# Patient Record
Sex: FEMALE | Race: WHITE | NOT HISPANIC OR LATINO | Employment: STUDENT | URBAN - METROPOLITAN AREA
[De-identification: names, ages, dates, MRNs, and addresses within clinical notes are randomized per-mention and may not be internally consistent; named-entity substitution may affect disease eponyms.]

---

## 2018-01-15 NOTE — MISCELLANEOUS
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

## 2019-11-29 ENCOUNTER — OFFICE VISIT (OUTPATIENT)
Dept: URGENT CARE | Facility: CLINIC | Age: 24
End: 2019-11-29
Payer: COMMERCIAL

## 2019-11-29 VITALS
BODY MASS INDEX: 52.37 KG/M2 | TEMPERATURE: 98.7 F | RESPIRATION RATE: 18 BRPM | DIASTOLIC BLOOD PRESSURE: 78 MMHG | OXYGEN SATURATION: 100 % | WEIGHT: 293 LBS | SYSTOLIC BLOOD PRESSURE: 142 MMHG | HEART RATE: 83 BPM

## 2019-11-29 DIAGNOSIS — J20.9 ACUTE BRONCHITIS, UNSPECIFIED ORGANISM: Primary | ICD-10-CM

## 2019-11-29 PROCEDURE — 99213 OFFICE O/P EST LOW 20 MIN: CPT | Performed by: PHYSICIAN ASSISTANT

## 2019-11-29 RX ORDER — AZITHROMYCIN 250 MG/1
TABLET, FILM COATED ORAL
Qty: 6 TABLET | Refills: 0 | Status: SHIPPED | OUTPATIENT
Start: 2019-11-29 | End: 2019-12-03

## 2019-11-29 RX ORDER — BENZONATATE 200 MG/1
200 CAPSULE ORAL 3 TIMES DAILY PRN
Qty: 20 CAPSULE | Refills: 0 | Status: SHIPPED | OUTPATIENT
Start: 2019-11-29

## 2019-11-29 NOTE — PATIENT INSTRUCTIONS
Acute Bronchitis   WHAT YOU NEED TO KNOW:   Acute bronchitis is swelling and irritation in the air passages of your lungs  This irritation may cause you to cough or have other breathing problems  Acute bronchitis often starts because of another illness, such as a cold or the flu  The illness spreads from your nose and throat to your windpipe and airways  Bronchitis is often called a chest cold  Acute bronchitis lasts about 3 to 6 weeks and is usually not a serious illness  Your cough can last for several weeks  DISCHARGE INSTRUCTIONS:   Return to the emergency department if:   · You cough up blood  · Your lips or fingernails turn blue  · You feel like you are not getting enough air when you breathe  Contact your healthcare provider if:   · You have a fever  · Your breathing problems do not go away or get worse  · Your cough does not get better within 4 weeks  · You have questions or concerns about your condition or care  Self-care:   · Get more rest   Rest helps your body to heal  Slowly start to do more each day  Rest when you feel it is needed  · Avoid irritants in the air  Avoid chemicals, fumes, and dust  Wear a face mask if you must work around dust or fumes  Stay inside on days when air pollution levels are high  If you have allergies, stay inside when pollen counts are high  Do not use aerosol products, such as spray-on deodorant, bug spray, and hair spray  · Do not smoke or be around others who smoke  Nicotine and other chemicals in cigarettes and cigars damages the cilia that move mucus out of your lungs  Ask your healthcare provider for information if you currently smoke and need help to quit  E-cigarettes or smokeless tobacco still contain nicotine  Talk to your healthcare provider before you use these products  · Drink liquids as directed  Liquids help keep your air passages moist and help you cough up mucus   You may need to drink more liquids when you have acute bronchitis  Ask how much liquid to drink each day and which liquids are best for you  · Use a humidifier or vaporizer  Use a cool mist humidifier or a vaporizer to increase air moisture in your home  This may make it easier for you to breathe and help decrease your cough  Decrease risk for acute bronchitis:   · Get the vaccinations you need  Ask your healthcare provider if you should get vaccinated against the flu or pneumonia  · Prevent the spread of germs  You can decrease your risk of acute bronchitis and other illnesses by doing the following:     [facility] your hands often with soap and water  Carry germ-killing hand lotion or gel with you  You can use the lotion or gel to clean your hands when soap and water are not available  ¨ Do not touch your eyes, nose, or mouth unless you have washed your hands first     ¨ Always cover your mouth when you cough to prevent the spread of germs  It is best to cough into a tissue or your shirt sleeve instead of into your hand  Ask those around you cover their mouths when they cough  ¨ Try to avoid people who have a cold or the flu  If you are sick, stay away from others as much as possible  Medicines: Your healthcare provider may  give you any of the following:  · Ibuprofen or acetaminophen  are medicines that help lower your fever  They are available without a doctor's order  Ask your healthcare provider which medicine is right for you  Ask how much to take and how often to take it  Follow directions  These medicines can cause stomach bleeding if not taken correctly  Ibuprofen can cause kidney damage  Do not take ibuprofen if you have kidney disease, an ulcer, or allergies to aspirin  Acetaminophen can cause liver damage  Do not take more than 4,000 milligrams in 24 hours  · Decongestants  help loosen mucus in your lungs and make it easier to cough up  This can help you breathe easier  · Cough suppressants  decrease your urge to cough   If your cough produces mucus, do not take a cough suppressant unless your healthcare provider tells you to  Your healthcare provider may suggest that you take a cough suppressant at night so you can rest     · Inhalers  may be given  Your healthcare provider may give you one or more inhalers to help you breathe easier and cough less  An inhaler gives your medicine to open your airways  Ask your healthcare provider to show you how to use your inhaler correctly  · Take your medicine as directed  Contact your healthcare provider if you think your medicine is not helping or if you have side effects  Tell him of her if you are allergic to any medicine  Keep a list of the medicines, vitamins, and herbs you take  Include the amounts, and when and why you take them  Bring the list or the pill bottles to follow-up visits  Carry your medicine list with you in case of an emergency  Follow up with your healthcare provider as directed:  Write down questions you have so you will remember to ask them during your follow-up visits  © 2017 [employer] Information is for End User's use only and may not be sold, redistributed or otherwise used for commercial purposes  All illustrations and images included in CareNotes® are the copyrighted property of [employer]  or [employer]  The above information is an  only  It is not intended as medical advice for individual conditions or treatments  Talk to your doctor, nurse or pharmacist before following any medical regimen to see if it is safe and effective for you

## 2019-11-29 NOTE — PROGRESS NOTES
[facility]        NAME: Negin Fontenot is a 22 y o  female  : 1995    MRN: [id number]  DATE: 2019  TIME: 5:57 PM    Assessment and Plan   Acute bronchitis, unspecified organism [J20 [id number]]  1  Acute bronchitis, unspecified organism  azithromycin (ZITHROMAX) 250 mg tablet    benzonatate (TESSALON) 200 MG capsule         Patient Instructions     Discussed condition with pt  She has acute bronchitis which I will treat with combination of Z-pack and Tessalon Perles and rec hydration, rest, discussed OTC cold meds, and observation  Follow up with PCP in 3-5 days  Proceed to  ER if symptoms worsen  Chief Complaint     Chief Complaint   Patient presents with    Cough     x 1 week, bringing up green/yellow mucus  Taking mucinex DM OTC  History of Present Illness       Pt presents with one week history of productive cough with yellow/green phlegm, PND, chills  Denies ST, nasal congestion, fever, N/V/D  Has taken Mucinex DM  Denies asthma/allergies  Does not smoke or vape  She has not had the flu shot  Review of Systems   Review of Systems   Constitutional: Positive for chills  Negative for fever  HENT: Positive for postnasal drip  Negative for congestion and sore throat  Respiratory: Positive for cough  Cardiovascular: Negative  Gastrointestinal: Negative  Genitourinary: Negative  Current Medications       Current Outpatient Medications:     azithromycin (ZITHROMAX) 250 mg tablet, Take 2 tablets day 1 with food, then 1 tablet daily days 2-5 with food  , Disp: 6 tablet, Rfl: 0    benzonatate (TESSALON) 200 MG capsule, Take 1 capsule (200 mg total) by mouth 3 (three) times a day as needed for cough, Disp: 20 capsule, Rfl: 0    Current Allergies     Allergies as of 2019    (No Known Allergies)            The following portions of the patient's history were reviewed and updated as appropriate: allergies, current medications, past family history, past medical history, past social history, past surgical history and problem list      Past Medical History:   Diagnosis Date    Acute torn meniscus        Past Surgical History:   Procedure Laterality Date    ADENOIDECTOMY         History reviewed  No pertinent family history  Medications have been verified  Objective   /78 (BP Location: Left arm, Patient Position: Sitting, Cuff Size: Large)   Pulse 83   Temp 98 7 °F (37 1 °C) (Tympanic)   Resp 18   Wt (!) 156 kg (344 lb 6 4 oz)   SpO2 100%   BMI 52 37 kg/m²        Physical Exam     Physical Exam   Constitutional: She is oriented to person, place, and time  She appears well-developed and well-nourished  No distress  HENT:   Right Ear: Hearing, tympanic membrane, external ear and ear canal normal    Left Ear: Hearing, tympanic membrane, external ear and ear canal normal    Mouth/Throat: Mucous membranes are normal  Posterior oropharyngeal erythema (PND) present  No oropharyngeal exudate  No tonsillar exudate  Neck: Neck supple  Cardiovascular: Normal rate, regular rhythm and normal heart sounds  Pulmonary/Chest: Effort normal  She has no wheezes  She has rhonchi (B/L diffuse coarse breath sounds heard throughout)  Lymphadenopathy:     She has no cervical adenopathy  Neurological: She is alert and oriented to person, place, and time  Psychiatric: She has a normal mood and affect  Vitals reviewed

## 2020-12-15 ENCOUNTER — VBI (OUTPATIENT)
Dept: ADMINISTRATIVE | Facility: OTHER | Age: 25
End: 2020-12-15

## 2021-02-10 ENCOUNTER — VBI (OUTPATIENT)
Dept: ADMINISTRATIVE | Facility: OTHER | Age: 26
End: 2021-02-10

## 2021-11-24 ENCOUNTER — VBI (OUTPATIENT)
Dept: ADMINISTRATIVE | Facility: OTHER | Age: 26
End: 2021-11-24

## 2024-02-06 ENCOUNTER — TELEPHONE (OUTPATIENT)
Dept: PODIATRY | Facility: CLINIC | Age: 29
End: 2024-02-06

## 2024-02-06 ENCOUNTER — HOSPITAL ENCOUNTER (OUTPATIENT)
Dept: RADIOLOGY | Facility: HOSPITAL | Age: 29
Discharge: HOME | End: 2024-02-06
Payer: COMMERCIAL

## 2024-02-06 ENCOUNTER — OFFICE VISIT (OUTPATIENT)
Dept: PULMONOLOGY | Facility: CLINIC | Age: 29
End: 2024-02-06
Payer: COMMERCIAL

## 2024-02-06 ENCOUNTER — OFFICE VISIT (OUTPATIENT)
Age: 29
End: 2024-02-06
Payer: COMMERCIAL

## 2024-02-06 VITALS
HEART RATE: 96 BPM | DIASTOLIC BLOOD PRESSURE: 78 MMHG | TEMPERATURE: 97.6 F | OXYGEN SATURATION: 96 % | SYSTOLIC BLOOD PRESSURE: 128 MMHG

## 2024-02-06 VITALS — HEIGHT: 70 IN | BODY MASS INDEX: 41.95 KG/M2 | RESPIRATION RATE: 17 BRPM | WEIGHT: 293 LBS

## 2024-02-06 DIAGNOSIS — S93.492A SPRAIN OF ANTERIOR TALOFIBULAR LIGAMENT OF LEFT ANKLE, INITIAL ENCOUNTER: ICD-10-CM

## 2024-02-06 DIAGNOSIS — S86.012A ACHILLES TENDON RUPTURE, LEFT, INITIAL ENCOUNTER: ICD-10-CM

## 2024-02-06 DIAGNOSIS — M25.572 ACUTE LEFT ANKLE PAIN: ICD-10-CM

## 2024-02-06 DIAGNOSIS — M76.72 PERONEAL TENDINITIS OF LEFT LOWER EXTREMITY: ICD-10-CM

## 2024-02-06 DIAGNOSIS — S86.012A ACHILLES TENDON RUPTURE, LEFT, INITIAL ENCOUNTER: Primary | ICD-10-CM

## 2024-02-06 DIAGNOSIS — Z01.811 PREOPERATIVE RESPIRATORY EXAMINATION: Primary | ICD-10-CM

## 2024-02-06 DIAGNOSIS — E66.09 OBESITY DUE TO EXCESS CALORIES WITHOUT SERIOUS COMORBIDITY, UNSPECIFIED CLASSIFICATION: ICD-10-CM

## 2024-02-06 PROBLEM — K58.9 IBS (IRRITABLE BOWEL SYNDROME): Status: ACTIVE | Noted: 2024-02-06

## 2024-02-06 PROBLEM — E66.1 DRUG-INDUCED OBESITY: Status: ACTIVE | Noted: 2024-02-06

## 2024-02-06 PROBLEM — E66.1 DRUG-INDUCED OBESITY: Status: RESOLVED | Noted: 2024-02-06 | Resolved: 2024-02-06

## 2024-02-06 PROCEDURE — 73610 X-RAY EXAM OF ANKLE: CPT

## 2024-02-06 PROCEDURE — 99203 OFFICE O/P NEW LOW 30 MIN: CPT | Performed by: INTERNAL MEDICINE

## 2024-02-06 PROCEDURE — 99204 OFFICE O/P NEW MOD 45 MIN: CPT | Performed by: PODIATRIST

## 2024-02-06 RX ORDER — ERGOCALCIFEROL 1.25 MG/1
50000 CAPSULE ORAL WEEKLY
COMMUNITY
Start: 2024-01-22

## 2024-02-06 RX ORDER — FLUCONAZOLE 150 MG/1
150 TABLET ORAL DAILY
COMMUNITY
Start: 2024-01-24

## 2024-02-06 RX ORDER — ENOXAPARIN SODIUM 100 MG/ML
INJECTION SUBCUTANEOUS
COMMUNITY
Start: 2024-01-24 | End: 2024-02-09 | Stop reason: ALTCHOICE

## 2024-02-06 RX ORDER — AMOXICILLIN AND CLAVULANATE POTASSIUM 875; 125 MG/1; MG/1
1 TABLET, FILM COATED ORAL EVERY 12 HOURS
COMMUNITY
Start: 2024-01-24
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
## 2024-02-06 NOTE — PROGRESS NOTES
Pulmonary Consultation   Genevieve Mcqueen 28 y.o. female MRN: [id number]  2/6/2024      Reason for Consultation:  Pre-operative evaluation    Requested by: Orthopedics    Assessment:  Pre-operative respiratory examination  Planned achilles tendon repair  She has no pulmonary contraindications to surgery  Her only medica comorbidity is obesity, however she does not have evidence of OHVS and had normal serum bicarb  She can proceed to surgery without further testing and is low risk for the procedure based upon ARISCAT and PALMER calculators  She should be wheeze free and at baseline respiratory status on day of surgery  She should be monitor for hypoxia and hypoventilation in the post-operative period given her obesity and risk for obstructive apneas    Obesity - as above    Plan:    Diagnoses and all orders for this visit:    Preoperative respiratory examination    Obesity due to excess calories without serious comorbidity, unspecified classification    Other orders  -     amoxicillin-clavulanate (AUGMENTIN) 875-125 mg per tablet; Take 1 tablet by mouth every 12 (twelve) hours  -     enoxaparin (LOVENOX) 40 mg/0.4 mL; INJECT 1 SYRINGE SUBCUTANEOUSLY ONCE DAILY  -     ergocalciferol (VITAMIN D2) 50,000 units; Take 50,000 Units by mouth once a week  -     fluconazole (DIFLUCAN) 150 mg tablet; Take 150 mg by mouth daily        History of Present Illness   HPI:  Genevieve Mcqueen is a 28 y.o. female who has obesity and IBS. She presenting for pre-operative evaluation prior to achilles tendon repair planned at [facility]. She reports she was advised for pulmonary evaluation secondary to obesity. She denied any history of respiratory disorders. She reports having inhaler for a few days as a child during respiratory infection. She denied any history of asthma, bronchospasm, recurrent respiratory infections, KRISTEL, ILD, or COPD. She reports prior to her injury she would climb 5 fights of stairs to her apartment without  difficulty. She denied any issues with prior anesthesia - only prior surgery was wisdom teeth extraction and adenoidectomy. She is not taking any medications.    Review of Systems   Constitutional:  Negative for activity change, chills and fever.   HENT:  Negative for mouth sores, sore throat and trouble swallowing.    Respiratory:  Negative for cough, chest tightness, shortness of breath and wheezing.    Cardiovascular:  Negative for chest pain and leg swelling.   Gastrointestinal:  Positive for diarrhea. Negative for abdominal pain, nausea and vomiting.   Endocrine: Negative for cold intolerance and heat intolerance.   Musculoskeletal:  Positive for gait problem.   Allergic/Immunologic: Negative for immunocompromised state.   Hematological:  Negative for adenopathy.   Psychiatric/Behavioral:  Negative for sleep disturbance. The patient is not nervous/anxious.        Historical Information   Past Medical History:   Diagnosis Date    Acute torn meniscus      Past Surgical History:   Procedure Laterality Date    ADENOIDECTOMY       History reviewed. No pertinent family history.    Occupational History: works in media, [place]    Social History: social ETOH, social tobacco and VAPING use    Meds/Allergies     Current Outpatient Medications:     amoxicillin-clavulanate (AUGMENTIN) 875-125 mg per tablet, Take 1 tablet by mouth every 12 (twelve) hours, Disp: , Rfl:     benzonatate (TESSALON) 200 MG capsule, Take 1 capsule (200 mg total) by mouth 3 (three) times a day as needed for cough, Disp: 20 capsule, Rfl: 0    enoxaparin (LOVENOX) 40 mg/0.4 mL, INJECT 1 SYRINGE SUBCUTANEOUSLY ONCE DAILY, Disp: , Rfl:     ergocalciferol (VITAMIN D2) 50,000 units, Take 50,000 Units by mouth once a week, Disp: , Rfl:     fluconazole (DIFLUCAN) 150 mg tablet, Take 150 mg by mouth daily, Disp: , Rfl:   Allergies   Allergen Reactions    Gluten Meal - Food Allergy Diarrhea and GI Intolerance       Vitals: Blood pressure 128/78, pulse 96,  temperature 97.6 °F (36.4 °C), temperature source Tympanic, SpO2 96%., There is no height or weight on file to calculate BMI. Oxygen Therapy  SpO2: 96 %  Oxygen Therapy: None (Room air)    Physical Exam  Physical Exam  Vitals reviewed.   Constitutional:       General: She is not in acute distress.     Appearance: Normal appearance. She is well-developed. She is obese. She is not ill-appearing, toxic-appearing or diaphoretic.   HENT:      Head: Normocephalic and atraumatic.      Right Ear: External ear normal.      Left Ear: External ear normal.      Nose: Nose normal.      Mouth/Throat:      Mouth: Mucous membranes are moist.      Pharynx: Oropharynx is clear. No oropharyngeal exudate.   Eyes:      General: No scleral icterus.        Right eye: No discharge.         Left eye: No discharge.      Conjunctiva/sclera: Conjunctivae normal.      Pupils: Pupils are equal, round, and reactive to light.   Neck:      Vascular: No JVD.      Trachea: No tracheal deviation.   Cardiovascular:      Rate and Rhythm: Normal rate and regular rhythm.      Heart sounds: Normal heart sounds. No murmur heard.     No gallop.   Pulmonary:      Effort: Pulmonary effort is normal. No respiratory distress.      Breath sounds: Normal breath sounds. No stridor. No wheezing, rhonchi or rales.   Abdominal:      General: Bowel sounds are normal. There is no distension.      Palpations: Abdomen is soft.      Tenderness: There is no abdominal tenderness. There is no guarding or rebound.   Musculoskeletal:         General: No deformity.      Right lower leg: No edema.      Left lower leg: Edema present.      Comments: Boot in place on LLE   Lymphadenopathy:      Cervical: No cervical adenopathy.   Skin:     General: Skin is warm and dry.      Coloration: Skin is not jaundiced.      Findings: No erythema or rash.   Neurological:      General: No focal deficit present.      Mental Status: She is alert and oriented to person, place, and time.      Gait:  "Gait abnormal.   Psychiatric:         Mood and Affect: Mood normal.         Behavior: Behavior normal.         Thought Content: Thought content normal.         Labs: I have personally reviewed pertinent lab results.  No results found for: \"WBC\", \"HGB\", \"HCT\", \"MCV\", \"PLT\"  No results found for: \"GLUCOSE\", \"CALCIUM\", \"NA\", \"K\", \"CO2\", \"CL\", \"BUN\", \"CREATININE\"  No results found for: \"IGE\"  No results found for: \"ALT\", \"AST\", \"GGT\", \"ALKPHOS\", \"BILITOT\"    CareEverywhere  4/27/2023 - WBC 8.5, Hgb 13.6, plt 347, SCr 0.75, serum bicarb 24    Imaging and other studies: I have personally reviewed pertinent reports.    CT Chest 7/27/2023 - [facility] - \"Resolved left lower lobe nodules.  Clear lungs\"    Pulmonary function testing: none available       Dioni Can DO, FACP  [facility]  "

## 2024-02-06 NOTE — TELEPHONE ENCOUNTER
Caller: Genevieve Mcqueen    Doctor/Office: Devin Serrano CB#: [phone]    Escalation: Genevieve is at [facility] to get her x-ray.  It should be for her left foot, not the right foot.  Please put a new order in as she will wait so she can have it done now

## 2024-02-07 ENCOUNTER — TELEPHONE (OUTPATIENT)
Age: 29
End: 2024-02-07

## 2024-02-07 NOTE — TELEPHONE ENCOUNTER
----- Message from Brooke Bernstein MA sent at 2/6/2024  4:30 PM EST -----  Regarding: FW: Appointment for Genevieve    ----- Message -----  From: Ed Forte DPM  Sent: 2/6/2024   4:12 PM EST  To: [facility]  Subject: Appointment for Genevieve                        Per Dr. Serrano, please schedule patient for an appointment with me this week for preoperative planning.

## 2024-02-07 NOTE — TELEPHONE ENCOUNTER
Contacted patient and offered appointment today. Patient requested Friday. Appointment made for this Friday @ 10AM. Aware of location.

## 2024-02-09 ENCOUNTER — OFFICE VISIT (OUTPATIENT)
Age: 29
End: 2024-02-09
Payer: COMMERCIAL

## 2024-02-09 VITALS
BODY MASS INDEX: 41.95 KG/M2 | SYSTOLIC BLOOD PRESSURE: 146 MMHG | HEART RATE: 81 BPM | HEIGHT: 70 IN | WEIGHT: 293 LBS | DIASTOLIC BLOOD PRESSURE: 96 MMHG

## 2024-02-09 DIAGNOSIS — S86.012A ACHILLES TENDON RUPTURE, LEFT, INITIAL ENCOUNTER: Primary | ICD-10-CM

## 2024-02-09 DIAGNOSIS — S93.492A SPRAIN OF ANTERIOR TALOFIBULAR LIGAMENT OF LEFT ANKLE, INITIAL ENCOUNTER: ICD-10-CM

## 2024-02-09 DIAGNOSIS — Z29.9 ENCOUNTER FOR DEEP VEIN THROMBOSIS (DVT) PROPHYLAXIS: ICD-10-CM

## 2024-02-09 PROCEDURE — 99213 OFFICE O/P EST LOW 20 MIN: CPT | Performed by: STUDENT IN AN ORGANIZED HEALTH CARE EDUCATION/TRAINING PROGRAM

## 2024-02-09 NOTE — PROGRESS NOTES
This patient was seen on 2/9/2024.    My role is Foot , Ankle, and Wound Specialist    ASSESSMENT     Diagnoses and all orders for this visit:    Achilles tendon rupture, left, initial encounter  -     Ambulatory referral to Physical Therapy; Future  -     rivaroxaban (Xarelto) 10 mg tablet; Take 1 tablet (10 mg total) by mouth daily  -     Carla Benoit    Encounter for deep vein thrombosis (DVT) prophylaxis  -     rivaroxaban (Xarelto) 10 mg tablet; Take 1 tablet (10 mg total) by mouth daily    Sprain of anterior talofibular ligament of left ankle, initial encounter         Problem List Items Addressed This Visit          Musculoskeletal and Integument    Achilles tendon rupture, left, initial encounter - Primary    Relevant Medications    rivaroxaban (Xarelto) 10 mg tablet    Other Relevant Orders    Ambulatory referral to Physical Therapy    [facility]     Other Visit Diagnoses       Encounter for deep vein thrombosis (DVT) prophylaxis        Relevant Medications    rivaroxaban (Xarelto) 10 mg tablet    Sprain of anterior talofibular ligament of left ankle, initial encounter              PLAN  -Genevieve, her mother, and I discussed her left ankle in detail  -At this point given that the injury is 4 weeks old and that she has been in somewhat of a plantarflexed position since the time of her injury also in conjunction with her comorbidities I recommend nonsurgical treatment at this time  -I did discuss with Genevieve that she is likely at an increased risk of secondary Achilles tendon rupture moving forward  -Patient provided with full set of heel wedges to go in cam boot  -Will begin physical therapy per nonoperative Achilles tendon rupture protocol  -Return to clinic 2 weeks    SUBJECTIVE    Chief Complaint:  Left achilles tendon rupture     Patient ID: Genevieve Mcqueen     2/9/2024: Genevieve is a pleasant 28-year-old female who presents today with a left Achilles tendon rupture.  She states that this  "happened on 1/13/2024 where she was stepping out of a bus in [facility].  She states that she felt a pop, fell over, and could not get up.  She states that on 1/15/2024 she went to the emergency department and was told that she had a fracture.  An ultrasound was then performed a few days later which revealed a full-thickness tear to the Achilles tendon.  She was then sent for an MRI which was performed on 1/20/2024 which did in fact reveal a full-thickness tear of the Achilles tendon with approximately 4 cm gapping.  She states that she was put into a cam boot with heel pads added for plantarflexion.  The following Thursday she was supposed to have her Achilles tendon fixed however she was unable to get surgical clearance given that it was a surgical center.  She then went and saw a local podiatrist who sent her to my office for discussion on surgical intervention.        The following portions of the patient's history were reviewed and updated as appropriate: allergies, current medications, past family history, past medical history, past social history, past surgical history and problem list.    Review of Systems   Constitutional: Negative.    HENT: Negative.     Respiratory: Negative.     Cardiovascular: Negative.    Gastrointestinal: Negative.    Musculoskeletal:  Positive for gait problem.   Skin: Negative.          OBJECTIVE      /96   Pulse 81   Ht 5' 10\" (1.778 m)   Wt (!) 163 kg (360 lb)   BMI 51.65 kg/m²        Physical Exam  Constitutional:       Appearance: Normal appearance. She is obese.   HENT:      Head: Normocephalic and atraumatic.   Eyes:      General:         Right eye: No discharge.         Left eye: No discharge.   Cardiovascular:      Rate and Rhythm: Normal rate and regular rhythm.      Pulses:           Dorsalis pedis pulses are 2+ on the right side and 2+ on the left side.        Posterior tibial pulses are 2+ on the right side and 2+ on the left side.   Pulmonary:      Effort: " Pulmonary effort is normal.      Breath sounds: Normal breath sounds.   Skin:     General: Skin is warm.      Capillary Refill: Capillary refill takes less than 2 seconds.   Neurological:      Sensory: Sensation is intact. No sensory deficit.         Vascular:  -DP and PT pulses intact b/l  -Capillary refill time <2 sec b/l  -Skin temp: WNL    MSK:  -Pain on palpation to left medial malleolus, left posterior leg at the Achilles tendon  -Palpable delve noted approximately 3cm proximal to Achilles insertion, there does appear to be new tendon/soft tissue bulk formation to the area  -Patel test: Positive  -Simmonds test: Positive    Neuro:  -Light sensation intact bilaterally  -Protective sensation intact bilaterally    Derm:  -No lesions, abrasions, or open wounds noted  -Edema not not present  -Ecchymosis not present

## 2024-02-12 ENCOUNTER — TELEPHONE (OUTPATIENT)
Dept: OBGYN CLINIC | Facility: HOSPITAL | Age: 29
End: 2024-02-12

## 2024-02-12 NOTE — TELEPHONE ENCOUNTER
Caller: Patient    Doctor: Reanna    Reason for call: Patient calling because she states Xarleto is too costly.  Is there an alternative?  Please advise.    [facility]    Call back#: [phone]

## 2024-02-19 ENCOUNTER — EVALUATION (OUTPATIENT)
Dept: PHYSICAL THERAPY | Facility: CLINIC | Age: 29
End: 2024-02-19
Payer: COMMERCIAL

## 2024-02-19 DIAGNOSIS — S86.012A ACHILLES TENDON RUPTURE, LEFT, INITIAL ENCOUNTER: Primary | ICD-10-CM

## 2024-02-19 PROCEDURE — 97161 PT EVAL LOW COMPLEX 20 MIN: CPT | Performed by: PHYSICAL THERAPIST

## 2024-02-19 PROCEDURE — 97110 THERAPEUTIC EXERCISES: CPT | Performed by: PHYSICAL THERAPIST

## 2024-02-19 PROCEDURE — 97530 THERAPEUTIC ACTIVITIES: CPT | Performed by: PHYSICAL THERAPIST

## 2024-02-19 NOTE — PROGRESS NOTES
PT Evaluation     Today's date: 2024  Patient name: Genevieve Mcqueen  : 1995  MRN: [id number]  Referring provider: Ed Forte DPM  Dx:   Encounter Diagnosis     ICD-10-CM    1. Achilles tendon rupture, left, initial encounter  S86.012A Ambulatory referral to Physical Therapy          Start Time: 0800  Stop Time: 0840  Total time in clinic (min): 40 minutes    Assessment  Assessment details: Genevieve Mcqueen is a 28 y.o. female who presents with complaints of Achilles tendon rupture, left, initial encounter  (primary encounter diagnosis).  No further referral appears necessary at this time based upon examination results.  Patient is presenting with decreased ankle strength and ROM leading to limitations with ambulation, standing, and stair navigation. Prognosis is good given HEP compliance and PT 1-2x/wk. Patient educated on non-op protocol, role of therapy, and expected time frame. Positive prognostic indicators include positive attitude toward recovery.   Please contact me if you have any questions or recommendations. Thank you for the opportunity to share in Genevieve's care.       Impairments: abnormal gait, abnormal muscle firing, abnormal muscle tone, abnormal or restricted ROM, abnormal movement, impaired physical strength and lacks appropriate home exercise program    Symptom irritability: lowUnderstanding of Dx/Px/POC: good   Prognosis: good    Plan  Patient would benefit from: skilled physical therapy  Planned therapy interventions: joint mobilization, manual therapy, patient education, postural training, strengthening, stretching, therapeutic activities, therapeutic exercise, home exercise program, neuromuscular re-education, flexibility, functional ROM exercises, balance, balance/weight bearing training and gait training  Frequency: 1-2x.  Duration in weeks: 8  Treatment plan discussed with: patient        Subjective Evaluation    History of Present Illness  Mechanism of injury: Pt  reported that she was getting off boss in [place] and misjudged curb and fell tearing her Achilles. She tired walking but was unable to do so. Patient was scheduled for surgery 1 week later, but developed calf pain and blood clot needed to be ruled out. Surgical center closed down and patient has seen podiatrist who referred her to therapy to follow non-op protocol as she had been 4 weeks non surgical at that time. Patient does not drive. Presenting with B/L axillary crutches. No calf pain at this time. 2 flights of stairs in home, but live in [place] 5 floor walk up.           Not a recurrent problem   Quality of life: good    Patient Goals  Patient goals for therapy: decreased pain, increased motion, increased strength and independence with ADLs/IADLs  Patient goal: return back to normal walking and stair navigation  Pain  Current pain ratin  At worst pain ratin  Quality: tight (warm)  Relieving factors: ice  Progression: improved    Treatments  Previous treatment: immobilization  Current treatment: immobilization      Short Term:  Pt will report decreased levels of pain by at least 2 subjective ratings in 4 weeks  Pt will demonstrate improved ROM by at least 10 degrees in 6 weeks  Pt will demonstrate improved strength by 1/2 grade in 6 weeks.  Pt will be able to ambulate> 10 minutes in shoe in 6 weeks  Long Term:   Pt will be independent in their HEP in 8 weeks  Pt will be pain free with IADL's in 8 weeks.  Pt will be able to perform B/L calf raise in 8 weeks.  Pt will be able to ascend steps with reciprocal gait pattern in 8 weeks     Objective    GAIT: decreased LLE weight acceptance with bilateral axillary crutches with step to gait pattern  Squat assess: deferred  SLS: RLE: deferred LLE: deferred           MMT    Hip         R          L   Flex. 5 4+   Extn. 5 4   Abd. 5 5                 MMT    Knee      R          L   Flex. 5 4+   Extn. 5 4+                MMT          AROM          PROM    Ankle          R          L          R         L          R         L   PF 5 N/t WNL WNL     DF. 5 4 +8 -20  deferred   DF bent knee 5 4 +10 -15  deferred   EHL 5 4       Inv. 5 3+ WNL WNL     Ever. 5 3+ WNL WNL     Foot Int. 5 3           Palpation: no TTP    Ankle:   anterior draw =   -    talar tilt=  -  Posterior draw=   -  inversion stress=  -   eversion stress=  -     joint findings= deferred  Patel test= not performed       Insurance:  AMA/CMS Eval/ Re-eval POC expires FOTO Auth #/ Referral # Total    Start date  Expiration date Extension  Visit limitation?  PT only or  PT+OT? Co-Insurance   CMS 2.19.24 4.15.24  Not needed     BOMN                     Precautions: non-op protocol provided  Patient provided verbal consent to treatment plan and recommended interventions.    Access Code: [email or web address]  URL: [email or web address]  Date: 02/19/2024  Prepared by: Junito Yancey    Exercises  - Supine Ankle Inversion and Eversion AROM  - 3 x daily - 7 x weekly - 2 sets - 10 reps  - Seated Heel Raise  - 2 x daily - 7 x weekly - 2 sets - 10 reps  - Supine Straight Leg Raises  - 1 x daily - 7 x weekly - 3 sets - 10 reps  - Sidelying Hip Abduction  - 1 x daily - 7 x weekly - 3 sets - 10 reps  - Prone Hip Extension  - 1 x daily - 7 x weekly - 3 sets - 10 reps    Date on injury: 1/13/24      Manuals 2.19                                                 Neuro Re-Ed                                                  Ther Ex          Prone hip ext 3*10         S/L hip abd. 3*10         SLR flexion 3*10         Seated heel raise 2*10         Seated ankle ev/iv. 2*10         Toe scrunch 2*10                             Ther Activity          Pt ed FB                   Gait Training                              Modalities

## 2024-02-23 ENCOUNTER — OFFICE VISIT (OUTPATIENT)
Age: 29
End: 2024-02-23
Payer: COMMERCIAL

## 2024-02-23 VITALS — RESPIRATION RATE: 17 BRPM | WEIGHT: 293 LBS | HEIGHT: 70 IN | BODY MASS INDEX: 41.95 KG/M2

## 2024-02-23 DIAGNOSIS — S86.012D ACHILLES TENDON RUPTURE, LEFT, SUBSEQUENT ENCOUNTER: Primary | ICD-10-CM

## 2024-02-23 PROCEDURE — 99212 OFFICE O/P EST SF 10 MIN: CPT | Performed by: STUDENT IN AN ORGANIZED HEALTH CARE EDUCATION/TRAINING PROGRAM

## 2024-02-23 NOTE — PROGRESS NOTES
This patient was seen on 2/23/2024.    My role is Foot , Ankle, and Wound Specialist    ASSESSMENT     Diagnoses and all orders for this visit:    Achilles tendon rupture, left, subsequent encounter         Problem List Items Addressed This Visit    None  Visit Diagnoses       Achilles tendon rupture, left, subsequent encounter    -  Primary          PLAN  -Genevieve and I discussed her left ankle  -Continue Xarelto versus aspirin 325 for DVT prophylaxis  -Continue physical therapy per nonoperative Achilles tendon protocol  -I did discuss with Genevieve that she is to wear her boot at all times this includes sleeping to prevent excess dorsiflexion.  -Return to clinic 2 weeks    SUBJECTIVE    Chief Complaint:  Left Achilles tendon rupture     Patient ID: Genevieve Mcqueen     2/9/2024: Genevieve is a pleasant 28-year-old female who presents today with a left Achilles tendon rupture.  She states that this happened on 1/13/2024 where she was stepping out of a bus in [facility].  She states that she felt a pop, fell over, and could not get up.  She states that on 1/15/2024 she went to the emergency department and was told that she had a fracture.  An ultrasound was then performed a few days later which revealed a full-thickness tear to the Achilles tendon.  She was then sent for an MRI which was performed on 1/20/2024 which did in fact reveal a full-thickness tear of the Achilles tendon with approximately 4 cm gapping.  She states that she was put into a cam boot with heel pads added for plantarflexion.  The following Thursday she was supposed to have her Achilles tendon fixed however she was unable to get surgical clearance given that it was a surgical center.  She then went and saw a local podiatrist who sent her to my office for discussion on surgical intervention.    2/23/2024: Genevieve states that she is doing well today.  She states that she has recently started physical therapy.  She states that she is wearing  "her cam boot with the full set of heel wedges at all times, however she does take it off at night when she goes to bed.        The following portions of the patient's history were reviewed and updated as appropriate: allergies, current medications, past family history, past medical history, past social history, past surgical history and problem list.    Review of Systems   Constitutional: Negative.    HENT: Negative.     Respiratory: Negative.     Cardiovascular: Negative.    Gastrointestinal: Negative.    Musculoskeletal:  Positive for gait problem.   Skin: Negative.          OBJECTIVE      Resp 17   Ht 5' 10\" (1.778 m)   Wt (!) 163 kg (360 lb)   BMI 51.65 kg/m²        Physical Exam  Constitutional:       Appearance: Normal appearance. She is obese.   HENT:      Head: Normocephalic and atraumatic.   Eyes:      General:         Right eye: No discharge.         Left eye: No discharge.   Cardiovascular:      Rate and Rhythm: Normal rate and regular rhythm.      Pulses:           Dorsalis pedis pulses are 2+ on the right side and 2+ on the left side.        Posterior tibial pulses are 2+ on the right side and 2+ on the left side.   Pulmonary:      Effort: Pulmonary effort is normal.      Breath sounds: Normal breath sounds.   Skin:     General: Skin is warm.      Capillary Refill: Capillary refill takes less than 2 seconds.   Neurological:      Sensory: Sensation is intact. No sensory deficit.         Vascular:  -DP and PT pulses intact b/l  -Capillary refill time <2 sec b/l  -Skin temp: WNL     MSK:  -Pain on palpation to left medial malleolus, left posterior leg at the Achilles tendon  -Palpable delve noted approximately 3cm proximal to Achilles insertion, there does appear to be new tendon/soft tissue bulk formation to the area  -Patel test: Positive  -Simmonds test: Positive     Neuro:  -Light sensation intact bilaterally  -Protective sensation intact bilaterally     Derm:  -No lesions, abrasions, or open " wounds noted  -Edema not not present  -Ecchymosis not present

## 2024-02-27 ENCOUNTER — OFFICE VISIT (OUTPATIENT)
Dept: PHYSICAL THERAPY | Facility: CLINIC | Age: 29
End: 2024-02-27
Payer: COMMERCIAL

## 2024-02-27 DIAGNOSIS — S86.012A ACHILLES TENDON RUPTURE, LEFT, INITIAL ENCOUNTER: Primary | ICD-10-CM

## 2024-02-27 PROCEDURE — 97530 THERAPEUTIC ACTIVITIES: CPT | Performed by: PHYSICAL THERAPIST

## 2024-02-27 PROCEDURE — 97110 THERAPEUTIC EXERCISES: CPT | Performed by: PHYSICAL THERAPIST

## 2024-02-27 NOTE — PROGRESS NOTES
Daily Note     Today's date: 2024  Patient name: Genevieve Mcqueen  : 1995  MRN: [id number]  Referring provider: Ed Forte DPM  Dx:   Encounter Diagnosis     ICD-10-CM    1. Achilles tendon rupture, left, initial encounter  S86.012A                    Subjective: Patient reports no pain at this time. Patient reports good compliance with HEP.     Objective: See treatment diary below    Assessment: Tolerated treatment well. Patient  reported that she felt okay with exercise progression. Educated to avoid end range inversion due to slight pull in medial aspect of ankle. One wedge removed from boot, patient educated to remove another wedge on Friday if feeling okay leaving 2 in the boot.  PROM to near neutral DF.    Plan: Continue per plan of care.      Insurance:  AMA/CMS Eval/ Re-eval POC expires FOTO Auth #/ Referral # Total    Start date  Expiration date Extension  Visit limitation?  PT only or  PT+OT? Co-Insurance   CMS 2.19.24 4.15.24  Not needed     BOMN                     Precautions: non-op protocol provided  Patient provided verbal consent to treatment plan and recommended interventions.    Prepared by: Junito Yancey    Access Code: [email or web address]  URL: [email or web address]  Date: 2024  Prepared by: Junito Yancey    Exercises  - Supine Ankle Inversion and Eversion AROM  - 2 x daily - 7 x weekly - 2 sets - 10 reps  - Seated Heel Raise  - 2 x daily - 7 x weekly - 2 sets - 10 reps  - Supine Straight Leg Raises  - 1 x daily - 7 x weekly - 3 sets - 10 reps  - Sidelying Hip Abduction  - 1 x daily - 7 x weekly - 3 sets - 10 reps  - Prone Hip Extension  - 1 x daily - 7 x weekly - 3 sets - 10 reps  - Seated Ankle Inversion with Resistance and Legs Crossed  - 2 x daily - 7 x weekly - 3 sets - 10 reps  - Seated Ankle Eversion with Resistance  - 2 x daily - 7 x weekly - 3 sets - 10 reps  - Seated Ankle Plantarflexion with Resistance  - 2 x daily - 7 x weekly - 3 sets - 10 reps    Date on  injury: 1/13/24    Manuals 2.19 2.27                                                Neuro Re-Ed                                                  Ther Ex          Prone hip ext 3*10         S/L hip abd. 3*10         SLR flexion 3*10         Seated heel raise 2*10 3*15        Seated ankle ev/iv. 2*10 3*10 GTB        Toe scrunch 2*10         PF band  3*10 GTB                                                                              Ther Activity          Pt ed FB FB                  Gait Training          Single crutch  2'                  Modalities

## 2024-03-04 ENCOUNTER — OFFICE VISIT (OUTPATIENT)
Dept: PHYSICAL THERAPY | Facility: CLINIC | Age: 29
End: 2024-03-04
Payer: COMMERCIAL

## 2024-03-04 DIAGNOSIS — S86.012A ACHILLES TENDON RUPTURE, LEFT, INITIAL ENCOUNTER: Primary | ICD-10-CM

## 2024-03-04 PROCEDURE — 97110 THERAPEUTIC EXERCISES: CPT | Performed by: PHYSICAL THERAPIST

## 2024-03-04 PROCEDURE — 97530 THERAPEUTIC ACTIVITIES: CPT | Performed by: PHYSICAL THERAPIST

## 2024-03-04 NOTE — PROGRESS NOTES
Daily Note     Today's date: 3/4/2024  Patient name: Genevieve Mcqueen  : 1995  MRN: [id number]  Referring provider: Ed Forte DPM  Dx:   Encounter Diagnosis     ICD-10-CM    1. Achilles tendon rupture, left, initial encounter  S86.012A                    Subjective: Patient is 7 weeks post date of injury. Patient reports having one wedge out and having pulling in the back of her Achilles. Feels okay with walking but worse when sitting and lying down.     Objective: See treatment diary below    Assessment: Tolerated treatment well. Patient fitted with even-up on RLE and allowed for ambulation without use of SPC. 3 wedges remain in boot and patient educated to try removing another one Wednesday. Boot adjusted and patient reported that she felt okay lying down with it on without pulling. Added weight-bearing light exercise for weight acceptance. Exercise education provided to avoid pulling in Achilles region.     Plan: Continue per plan of care.      Ancelmo/Brandan Achilles protocol for nonoperative treatment    0-2 weeks    Plaster cast with ankle in maximum passive plantar flexion; non-weight  bearing with crutches    2-4 weeks   Achilles-specific (or other) walking boot with maximum passive plantar-flexed  heel lifts  Protected weight bearing with crutches:  Weeks 2-3--25%  Weeks 3-4--50%  Weeks 4-5--75%  Weeks 5-6--100%  Active plantar and dorsiflexion ROM exercises to neutral, inversion/eversion  below neutral  Modalities to control swelling (ultrasound, interferential current with ice,  acupuncture, light/laser therapy)  Electrical muscle stimulation to calf musculature with seated heel raises when  tolerated.  Patients being seen 2-3 times/wk depending on availability and degree of  pain and swelling in the foot and ankle  Knee/hip exercises with no ankle involvement, for example, leg lifts from  sitting, prone, or side-lying  Non-weight-bearing fitness/cardio work, for example, biking with 1  leg (with  boot walker on), deep water running (usually not started until 3-4 wk point)  Hydrotherapy if available (within motion and weight-bearing limitations)  Emphasize need of patient to use pain as guideline. If in pain, back off  activities and weight bearing.    4-6 weeks   Continue weight bearing as tolerated  Continue 2-4 wk protocol  Progress electrical muscle stimulation to calf with lying calf raises on shuttle  with no resistance as tolerated approximately weeks 5-6. Please ensure that  ankle does not go past neutral while doing exercises.  Continue with physiotherapy 2-3 times/wk.  Emphasize patient doing non-weight-bearing cardio activities as tolerated  with boot walker on.    6-8 weeks   Continue physiotherapy 2 times/week  Continue with modalities for swelling as needed.  Continue with electrical muscle stimulation on calf with strengthening  exercises. Do not go past neutral ankle position.  Remove heel lifts in stages dependent on Achilles length. Remove 1 lift daily  as tolerated. Always leave 1-2 lifts in to represent regular shoe lift, depending  on boot design.  Weight bearing as tolerated, usually 100% weight bearing in boot walker  now.  Graduated resistance exercises (open and closed kinetic chain as well as  functional activities)--start with resisted tubing exercises  With weighted-resisted exercises, do not go past neutral ankle position.  Gait retraining now that 100% weight bearing  Fitness/cardio to include weight bearing as tolerated, for example, biking  Hydrotherapy    8-12 weeks   Ensure patient understands that tendon is still very vulnerable, and patients  need to be diligent with activities of daily living and exercises. Any sudden  loading of the Achilles (trip, step up stairs, etc.) may result in a rerupture.  Wean off boot (usually over 2-5 d process--varies per patient), at night as well  Wear Achilles compression ankle brace to provide extra stability and swelling  control once boot walker is removed.  Return to crutches/cane as necessary and gradually wean off. Have patient  always wear shoes, limiting time in bare/sock feet.  Continue to progress to ROM, strength, and proprioception exercises.  Add exercises, such as stationary bicycle, elliptical, and walking on treadmill,  as patient tolerates.  Add balance board activities--standing with block to prevent dorsiflexion  past neutral position.  Add calf stretches in standing (gently). Do not allow ankle to go past neutral  position.  Add double-heel raises and progress to single-heel raises when tolerated. Do  not allow ankle to go past neutral position.  Continue physiotherapy 1-2 times/wk depending on how independent  patient is at doing exercises and access to exercise equipment.      12-16 weeks  Continue to progress ROM, strength, and proprioception exercises.  Retrain strength, power, endurance.  Ensure patient understands that tendon is still very vulnerable and patients  need to be diligent with activities of daily living and exercises. Avoid lunges,  squats, etc., because these places excessive stretch on tendon.    16+ weeks   Increase dynamic weight-bearing exercise, including sport-specific retaining  (ie, skipping, jogging, and weight training).    6-9 months  Return to normal sporting activities that do not involve contact or sprinting,  cutting jumping, etc., if patient has regained 80% strength    12 months   Return to sports that involve running/jumping as directed by medical team  and tolerated if patient has regained 100% strength.      Insurance:  AMA/CMS Eval/ Re-eval POC expires FOTO Auth #/ Referral # Total    Start date  Expiration date Extension  Visit limitation?  PT only or  PT+OT? Co-Insurance   CMS 2.19.24 4.15.24  Not needed     BOMN                     Precautions: non-op protocol provided  Patient provided verbal consent to treatment plan and recommended interventions.    Prepared by: Junito  Bc    Access Code: [email or web address]  URL: [email or web address]  Date: 02/27/2024  Prepared by: Junito Yancey    Exercises  - Supine Ankle Inversion and Eversion AROM  - 2 x daily - 7 x weekly - 2 sets - 10 reps  - Seated Heel Raise  - 2 x daily - 7 x weekly - 2 sets - 10 reps  - Supine Straight Leg Raises  - 1 x daily - 7 x weekly - 3 sets - 10 reps  - Sidelying Hip Abduction  - 1 x daily - 7 x weekly - 3 sets - 10 reps  - Prone Hip Extension  - 1 x daily - 7 x weekly - 3 sets - 10 reps  - Seated Ankle Inversion with Resistance and Legs Crossed  - 2 x daily - 7 x weekly - 3 sets - 10 reps  - Seated Ankle Eversion with Resistance  - 2 x daily - 7 x weekly - 3 sets - 10 reps  - Seated Ankle Plantarflexion with Resistance  - 2 x daily - 7 x weekly - 3 sets - 10 reps    Date on injury: 1/13/24    Manuals 2.19 2.27 3.4.24       visit 1 2 3                                     Neuro Re-Ed                                                  Ther Ex          Prone hip ext 3*10         S/L hip abd. 3*10         SLR flexion 3*10         Seated heel raise 2*10 3*15 2*15       Seated ankle ev/iv. 2*10 3*10 GTB        Toe scrunch 2*10         PF band  3*10 GTB Blue TB       Stand hip abd   3*10 RLE moving       Min squat (RLE elevated 2'')   3*10                 Ther Activity          Pt ed FB FB FB       Boot adjustment   FB       Gait Training          Single crutch  2'        No crutch   3'       Modalities

## 2024-03-08 ENCOUNTER — OFFICE VISIT (OUTPATIENT)
Age: 29
End: 2024-03-08
Payer: COMMERCIAL

## 2024-03-08 VITALS
WEIGHT: 293 LBS | DIASTOLIC BLOOD PRESSURE: 98 MMHG | HEART RATE: 102 BPM | BODY MASS INDEX: 41.95 KG/M2 | HEIGHT: 70 IN | SYSTOLIC BLOOD PRESSURE: 148 MMHG

## 2024-03-08 DIAGNOSIS — S86.012D ACHILLES TENDON RUPTURE, LEFT, SUBSEQUENT ENCOUNTER: Primary | ICD-10-CM

## 2024-03-08 PROCEDURE — 99212 OFFICE O/P EST SF 10 MIN: CPT | Performed by: STUDENT IN AN ORGANIZED HEALTH CARE EDUCATION/TRAINING PROGRAM
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
## 2024-03-12 ENCOUNTER — OFFICE VISIT (OUTPATIENT)
Dept: PHYSICAL THERAPY | Facility: CLINIC | Age: 29
End: 2024-03-12
Payer: COMMERCIAL

## 2024-03-12 DIAGNOSIS — S86.012A ACHILLES TENDON RUPTURE, LEFT, INITIAL ENCOUNTER: Primary | ICD-10-CM

## 2024-03-12 PROCEDURE — 97110 THERAPEUTIC EXERCISES: CPT | Performed by: PHYSICAL THERAPIST

## 2024-03-12 NOTE — PROGRESS NOTES
Daily Note     Today's date: 3/12/2024  Patient name: Genevieve Mcqueen  : 1995  MRN: [id number]  Referring provider: Ed Forte DPM  Dx:   Encounter Diagnosis     ICD-10-CM    1. Achilles tendon rupture, left, initial encounter  S86.012A           Start Time: 0800  Stop Time: 0825  Total time in clinic (min): 25 minutes  Subjective: Patient is 8 weeks post date of injury. Patient reports taking 2nd wedge out last Wednesday and has been doing well. Reports good compliance with boot and no pulling at this time. Has been using even up without issue.     Objective: See treatment diary below    Assessment: Tolerated treatment well. Attempted walking in shoe but felt pulling in foot and patient returned to boot with 1 wedge. Will attempt gentle progression into shoe over the next few days with education to avoid increased pulling in ankle. Explained time frame for transitioning into shoe. Patient verbalized understanding.     Plan: Continue per plan of care.      Ancelmo/Brandan Achilles protocol for nonoperative treatment    0-2 weeks    Plaster cast with ankle in maximum passive plantar flexion; non-weight  bearing with crutches    2-4 weeks   Achilles-specific (or other) walking boot with maximum passive plantar-flexed  heel lifts  Protected weight bearing with crutches:  Weeks 2-3--25%  Weeks 3-4--50%  Weeks 4-5--75%  Weeks 5-6--100%  Active plantar and dorsiflexion ROM exercises to neutral, inversion/eversion  below neutral  Modalities to control swelling (ultrasound, interferential current with ice,  acupuncture, light/laser therapy)  Electrical muscle stimulation to calf musculature with seated heel raises when  tolerated.  Patients being seen 2-3 times/wk depending on availability and degree of  pain and swelling in the foot and ankle  Knee/hip exercises with no ankle involvement, for example, leg lifts from  sitting, prone, or side-lying  Non-weight-bearing fitness/cardio work, for example, biking  with 1 leg (with  boot walker on), deep water running (usually not started until 3-4 wk point)  Hydrotherapy if available (within motion and weight-bearing limitations)  Emphasize need of patient to use pain as guideline. If in pain, back off  activities and weight bearing.    4-6 weeks   Continue weight bearing as tolerated  Continue 2-4 wk protocol  Progress electrical muscle stimulation to calf with lying calf raises on shuttle  with no resistance as tolerated approximately weeks 5-6. Please ensure that  ankle does not go past neutral while doing exercises.  Continue with physiotherapy 2-3 times/wk.  Emphasize patient doing non-weight-bearing cardio activities as tolerated  with boot walker on.    6-8 weeks   Continue physiotherapy 2 times/week  Continue with modalities for swelling as needed.  Continue with electrical muscle stimulation on calf with strengthening  exercises. Do not go past neutral ankle position.  Remove heel lifts in stages dependent on Achilles length. Remove 1 lift daily  as tolerated. Always leave 1-2 lifts in to represent regular shoe lift, depending  on boot design.  Weight bearing as tolerated, usually 100% weight bearing in boot walker  now.  Graduated resistance exercises (open and closed kinetic chain as well as  functional activities)--start with resisted tubing exercises  With weighted-resisted exercises, do not go past neutral ankle position.  Gait retraining now that 100% weight bearing  Fitness/cardio to include weight bearing as tolerated, for example, biking  Hydrotherapy    8-12 weeks   Ensure patient understands that tendon is still very vulnerable, and patients  need to be diligent with activities of daily living and exercises. Any sudden  loading of the Achilles (trip, step up stairs, etc.) may result in a rerupture.  Wean off boot (usually over 2-5 d process--varies per patient), at night as well  Wear Achilles compression ankle brace to provide extra stability and swelling  control once boot walker is removed.  Return to crutches/cane as necessary and gradually wean off. Have patient  always wear shoes, limiting time in bare/sock feet.  Continue to progress to ROM, strength, and proprioception exercises.  Add exercises, such as stationary bicycle, elliptical, and walking on treadmill,  as patient tolerates.  Add balance board activities--standing with block to prevent dorsiflexion  past neutral position.  Add calf stretches in standing (gently). Do not allow ankle to go past neutral  position.  Add double-heel raises and progress to single-heel raises when tolerated. Do  not allow ankle to go past neutral position.  Continue physiotherapy 1-2 times/wk depending on how independent  patient is at doing exercises and access to exercise equipment.    12-16 weeks  Continue to progress ROM, strength, and proprioception exercises.  Retrain strength, power, endurance.  Ensure patient understands that tendon is still very vulnerable and patients  need to be diligent with activities of daily living and exercises. Avoid lunges,  squats, etc., because these places excessive stretch on tendon.    16+ weeks   Increase dynamic weight-bearing exercise, including sport-specific retaining  (ie, skipping, jogging, and weight training).    6-9 months  Return to normal sporting activities that do not involve contact or sprinting,  cutting jumping, etc., if patient has regained 80% strength    12 months   Return to sports that involve running/jumping as directed by medical team  and tolerated if patient has regained 100% strength.      Insurance:  AMA/CMS Eval/ Re-eval POC expires FOTO Auth #/ Referral # Total    Start date  Expiration date Extension  Visit limitation?  PT only or  PT+OT? Co-Insurance   CMS 2.19.24 4.15.24  Not needed     BOMN                     Precautions: non-op protocol provided  Patient provided verbal consent to treatment plan and recommended interventions.    Prepared by: Junito  "Bc    Access Code: [email or web address]  URL: [email or web address]  Date: 02/27/2024  Prepared by: Junito Yancey    Exercises  - Supine Ankle Inversion and Eversion AROM  - 2 x daily - 7 x weekly - 2 sets - 10 reps  - Seated Heel Raise  - 2 x daily - 7 x weekly - 2 sets - 10 reps  - Supine Straight Leg Raises  - 1 x daily - 7 x weekly - 3 sets - 10 reps  - Sidelying Hip Abduction  - 1 x daily - 7 x weekly - 3 sets - 10 reps  - Prone Hip Extension  - 1 x daily - 7 x weekly - 3 sets - 10 reps  - Seated Ankle Inversion with Resistance and Legs Crossed  - 2 x daily - 7 x weekly - 3 sets - 10 reps  - Seated Ankle Eversion with Resistance  - 2 x daily - 7 x weekly - 3 sets - 10 reps  - Seated Ankle Plantarflexion with Resistance  - 2 x daily - 7 x weekly - 3 sets - 10 reps    Date on injury: 1/13/24    Manuals 2.19 2.27 3.4.24 3.12      visit 1 2 3 4                                    Neuro Re-Ed          SLS    4*20'' LLE with UE asst.                                    Ther Ex          Seated heel raise 2*10 3*15 2*15       Seated ankle ev/iv. 2*10 3*10 GTB        Toe scrunch 2*10         PF band  3*10 GTB Blue TB Black TB      Stand hip abd   3*10 RLE moving 3*10 RLE moving      Min squat (RLE elevated 2'')   3*10 2*10 no elevation      Ant step up    2*10, 4\" LLE                                    Ther Activity          Pt ed FB FB FB       Boot adjustment   FB       Gait Training          Single crutch  2'        No crutch   3'       Modalities                                   "

## 2024-03-19 ENCOUNTER — OFFICE VISIT (OUTPATIENT)
Dept: PHYSICAL THERAPY | Facility: CLINIC | Age: 29
End: 2024-03-19
Payer: COMMERCIAL

## 2024-03-19 DIAGNOSIS — S86.012A ACHILLES TENDON RUPTURE, LEFT, INITIAL ENCOUNTER: Primary | ICD-10-CM

## 2024-03-19 PROCEDURE — 97110 THERAPEUTIC EXERCISES: CPT | Performed by: PHYSICAL THERAPIST

## 2024-03-19 PROCEDURE — 97116 GAIT TRAINING THERAPY: CPT | Performed by: PHYSICAL THERAPIST

## 2024-03-19 NOTE — PROGRESS NOTES
Daily Note     Today's date: 3/19/2024  Patient name: Genevieve Mcqueen  : 1995  MRN: [id number]  Referring provider: Ed Forte DPM  Dx:   Encounter Diagnosis     ICD-10-CM    1. Achilles tendon rupture, left, initial encounter  S86.012A                    Subjective: Patient reports that she has been doing well and wearing shoe for about 15 minutes total a day. Reports feeling off-balanced at times.     Objective: See treatment diary below    Assessment: Tolerated treatment well. Patient demonstrated improved gait pattern with shoe on. Able to ambulate with step to gait pattern leading with LLE. Advised to continue to progress weaning into shoe adding one hour each day.     Plan: Continue per plan of care.      Ancelmo/Brandan Achilles protocol for nonoperative treatment    0-2 weeks    Plaster cast with ankle in maximum passive plantar flexion; non-weight  bearing with crutches    2-4 weeks   Achilles-specific (or other) walking boot with maximum passive plantar-flexed  heel lifts  Protected weight bearing with crutches:  Weeks 2-3--25%  Weeks 3-4--50%  Weeks 4-5--75%  Weeks 5-6--100%  Active plantar and dorsiflexion ROM exercises to neutral, inversion/eversion  below neutral  Modalities to control swelling (ultrasound, interferential current with ice,  acupuncture, light/laser therapy)  Electrical muscle stimulation to calf musculature with seated heel raises when  tolerated.  Patients being seen 2-3 times/wk depending on availability and degree of  pain and swelling in the foot and ankle  Knee/hip exercises with no ankle involvement, for example, leg lifts from  sitting, prone, or side-lying  Non-weight-bearing fitness/cardio work, for example, biking with 1 leg (with  boot walker on), deep water running (usually not started until 3-4 wk point)  Hydrotherapy if available (within motion and weight-bearing limitations)  Emphasize need of patient to use pain as guideline. If in pain, back  off  activities and weight bearing.    4-6 weeks   Continue weight bearing as tolerated  Continue 2-4 wk protocol  Progress electrical muscle stimulation to calf with lying calf raises on shuttle  with no resistance as tolerated approximately weeks 5-6. Please ensure that  ankle does not go past neutral while doing exercises.  Continue with physiotherapy 2-3 times/wk.  Emphasize patient doing non-weight-bearing cardio activities as tolerated  with boot walker on.    6-8 weeks   Continue physiotherapy 2 times/week  Continue with modalities for swelling as needed.  Continue with electrical muscle stimulation on calf with strengthening  exercises. Do not go past neutral ankle position.  Remove heel lifts in stages dependent on Achilles length. Remove 1 lift daily  as tolerated. Always leave 1-2 lifts in to represent regular shoe lift, depending  on boot design.  Weight bearing as tolerated, usually 100% weight bearing in boot walker  now.  Graduated resistance exercises (open and closed kinetic chain as well as  functional activities)--start with resisted tubing exercises  With weighted-resisted exercises, do not go past neutral ankle position.  Gait retraining now that 100% weight bearing  Fitness/cardio to include weight bearing as tolerated, for example, biking  Hydrotherapy    8-12 weeks   Ensure patient understands that tendon is still very vulnerable, and patients  need to be diligent with activities of daily living and exercises. Any sudden  loading of the Achilles (trip, step up stairs, etc.) may result in a rerupture.  Wean off boot (usually over 2-5 d process--varies per patient), at night as well  Wear Achilles compression ankle brace to provide extra stability and swelling control once boot walker is removed.  Return to crutches/cane as necessary and gradually wean off. Have patient  always wear shoes, limiting time in bare/sock feet.  Continue to progress to ROM, strength, and proprioception  exercises.  Add exercises, such as stationary bicycle, elliptical, and walking on treadmill,  as patient tolerates.  Add balance board activities--standing with block to prevent dorsiflexion  past neutral position.  Add calf stretches in standing (gently). Do not allow ankle to go past neutral  position.  Add double-heel raises and progress to single-heel raises when tolerated. Do  not allow ankle to go past neutral position.  Continue physiotherapy 1-2 times/wk depending on how independent  patient is at doing exercises and access to exercise equipment.    12-16 weeks  Continue to progress ROM, strength, and proprioception exercises.  Retrain strength, power, endurance.  Ensure patient understands that tendon is still very vulnerable and patients  need to be diligent with activities of daily living and exercises. Avoid lunges,  squats, etc., because these places excessive stretch on tendon.    16+ weeks   Increase dynamic weight-bearing exercise, including sport-specific retaining  (ie, skipping, jogging, and weight training).    6-9 months  Return to normal sporting activities that do not involve contact or sprinting,  cutting jumping, etc., if patient has regained 80% strength    12 months   Return to sports that involve running/jumping as directed by medical team  and tolerated if patient has regained 100% strength.      Insurance:  AMA/CMS Eval/ Re-eval POC expires FOTO Auth #/ Referral # Total    Start date  Expiration date Extension  Visit limitation?  PT only or  PT+OT? Co-Insurance   CMS 2.19.24 4.15.24  Not needed     BOMN                     Precautions: non-op protocol provided  Patient provided verbal consent to treatment plan and recommended interventions.    Prepared by: Junito Yancey    Access Code: [email or web address]  URL: [email or web address]  Date: 02/27/2024  Prepared by: Junito Yancey    Exercises  - Supine Ankle Inversion and Eversion AROM  - 2 x daily - 7 x weekly - 2 sets - 10 reps  - Seated  "Heel Raise  - 2 x daily - 7 x weekly - 2 sets - 10 reps  - Supine Straight Leg Raises  - 1 x daily - 7 x weekly - 3 sets - 10 reps  - Sidelying Hip Abduction  - 1 x daily - 7 x weekly - 3 sets - 10 reps  - Prone Hip Extension  - 1 x daily - 7 x weekly - 3 sets - 10 reps  - Seated Ankle Inversion with Resistance and Legs Crossed  - 2 x daily - 7 x weekly - 3 sets - 10 reps  - Seated Ankle Eversion with Resistance  - 2 x daily - 7 x weekly - 3 sets - 10 reps  - Seated Ankle Plantarflexion with Resistance  - 2 x daily - 7 x weekly - 3 sets - 10 reps    Date on injury: 1/13/24    Manuals 2.19 2.27 3.4.24 3.12 3.19     visit 1 2 3 4 5                                   Neuro Re-Ed          SLS    4*20'' LLE with UE asst.      Feet together EC     3*30''                         Ther Ex          Seated heel raise 2*10 3*15 2*15       Seated ankle ev/iv. 2*10 3*10 GTB        Toe scrunch 2*10         PF band  3*10 GTB Blue TB Black TB      Stand hip abd   3*10 RLE moving 3*10 RLE moving 3*10 RLE moving     Min squat (RLE elevated 2'')   3*10 2*10 no elevation      Ant step up    2*10, 4\" LLE 10x, 6\" LLE                                   Ther Activity          Pt ed FB FB FB       Boot adjustment   FB       Gait Training          Single crutch  2'        No crutch   3'  Step back/forth 3*2'     Modalities                                   "

## 2024-03-22 ENCOUNTER — OFFICE VISIT (OUTPATIENT)
Age: 29
End: 2024-03-22
Payer: COMMERCIAL

## 2024-03-22 VITALS
HEART RATE: 83 BPM | HEIGHT: 70 IN | SYSTOLIC BLOOD PRESSURE: 134 MMHG | BODY MASS INDEX: 41.95 KG/M2 | DIASTOLIC BLOOD PRESSURE: 85 MMHG | WEIGHT: 293 LBS

## 2024-03-22 DIAGNOSIS — S86.012D ACHILLES TENDON RUPTURE, LEFT, SUBSEQUENT ENCOUNTER: Primary | ICD-10-CM

## 2024-03-22 PROCEDURE — 99212 OFFICE O/P EST SF 10 MIN: CPT | Performed by: STUDENT IN AN ORGANIZED HEALTH CARE EDUCATION/TRAINING PROGRAM
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
## 2024-03-26 ENCOUNTER — OFFICE VISIT (OUTPATIENT)
Dept: PHYSICAL THERAPY | Facility: CLINIC | Age: 29
End: 2024-03-26
Payer: COMMERCIAL

## 2024-03-26 DIAGNOSIS — S86.012A ACHILLES TENDON RUPTURE, LEFT, INITIAL ENCOUNTER: Primary | ICD-10-CM

## 2024-03-26 PROCEDURE — 97110 THERAPEUTIC EXERCISES: CPT | Performed by: PHYSICAL THERAPIST

## 2024-03-26 NOTE — PROGRESS NOTES
Daily Note     Today's date: 3/26/2024  Patient name: Genevieve Mcqueen  : 1995  MRN: [id number]  Referring provider: Ed Forte DPM  Dx:   Encounter Diagnosis     ICD-10-CM    1. Achilles tendon rupture, left, initial encounter  S86.012A                    Subjective: Patient reports doing well at this time and wearing her shoe for about 6 hours a day. Patient is a little over 10 weeks out from DOI.     Objective: See treatment diary below    Assessment: Tolerated treatment well. Improved gait pattern and speed noted at end of session with still decreased step length RLE with step through. Able to progress to normal height step with step up exercises. Good tolerance to addition of double leg mini calf raise.     Plan: add treadmill next visit.      Ancelmo/Brandan Achilles protocol for nonoperative treatment    0-2 weeks    Plaster cast with ankle in maximum passive plantar flexion; non-weight  bearing with crutches    2-4 weeks   Achilles-specific (or other) walking boot with maximum passive plantar-flexed  heel lifts  Protected weight bearing with crutches:  Weeks 2-3--25%  Weeks 3-4--50%  Weeks 4-5--75%  Weeks 5-6--100%  Active plantar and dorsiflexion ROM exercises to neutral, inversion/eversion  below neutral  Modalities to control swelling (ultrasound, interferential current with ice,  acupuncture, light/laser therapy)  Electrical muscle stimulation to calf musculature with seated heel raises when  tolerated.  Patients being seen 2-3 times/wk depending on availability and degree of  pain and swelling in the foot and ankle  Knee/hip exercises with no ankle involvement, for example, leg lifts from  sitting, prone, or side-lying  Non-weight-bearing fitness/cardio work, for example, biking with 1 leg (with  boot walker on), deep water running (usually not started until 3-4 wk point)  Hydrotherapy if available (within motion and weight-bearing limitations)  Emphasize need of patient to use pain as  guideline. If in pain, back off  activities and weight bearing.    4-6 weeks   Continue weight bearing as tolerated  Continue 2-4 wk protocol  Progress electrical muscle stimulation to calf with lying calf raises on shuttle  with no resistance as tolerated approximately weeks 5-6. Please ensure that  ankle does not go past neutral while doing exercises.  Continue with physiotherapy 2-3 times/wk.  Emphasize patient doing non-weight-bearing cardio activities as tolerated  with boot walker on.    6-8 weeks   Continue physiotherapy 2 times/week  Continue with modalities for swelling as needed.  Continue with electrical muscle stimulation on calf with strengthening  exercises. Do not go past neutral ankle position.  Remove heel lifts in stages dependent on Achilles length. Remove 1 lift daily  as tolerated. Always leave 1-2 lifts in to represent regular shoe lift, depending  on boot design.  Weight bearing as tolerated, usually 100% weight bearing in boot walker  now.  Graduated resistance exercises (open and closed kinetic chain as well as  functional activities)--start with resisted tubing exercises  With weighted-resisted exercises, do not go past neutral ankle position.  Gait retraining now that 100% weight bearing  Fitness/cardio to include weight bearing as tolerated, for example, biking  Hydrotherapy    8-12 weeks   Ensure patient understands that tendon is still very vulnerable, and patients  need to be diligent with activities of daily living and exercises. Any sudden  loading of the Achilles (trip, step up stairs, etc.) may result in a rerupture.  Wean off boot (usually over 2-5 d process--varies per patient), at night as well  Wear Achilles compression ankle brace to provide extra stability and swelling control once boot walker is removed.  Return to crutches/cane as necessary and gradually wean off. Have patient  always wear shoes, limiting time in bare/sock feet.  Continue to progress to ROM, strength, and  proprioception exercises.  Add exercises, such as stationary bicycle, elliptical, and walking on treadmill,  as patient tolerates.  Add balance board activities--standing with block to prevent dorsiflexion  past neutral position.  Add calf stretches in standing (gently). Do not allow ankle to go past neutral  position.  Add double-heel raises and progress to single-heel raises when tolerated. Do  not allow ankle to go past neutral position.  Continue physiotherapy 1-2 times/wk depending on how independent  patient is at doing exercises and access to exercise equipment.    12-16 weeks  Continue to progress ROM, strength, and proprioception exercises.  Retrain strength, power, endurance.  Ensure patient understands that tendon is still very vulnerable and patients  need to be diligent with activities of daily living and exercises. Avoid lunges,  squats, etc., because these places excessive stretch on tendon.    16+ weeks   Increase dynamic weight-bearing exercise, including sport-specific retaining  (ie, skipping, jogging, and weight training).    6-9 months  Return to normal sporting activities that do not involve contact or sprinting,  cutting jumping, etc., if patient has regained 80% strength    12 months   Return to sports that involve running/jumping as directed by medical team  and tolerated if patient has regained 100% strength.      Insurance:  AMA/CMS Eval/ Re-eval POC expires FOTO Auth #/ Referral # Total    Start date  Expiration date Extension  Visit limitation?  PT only or  PT+OT? Co-Insurance   CMS 2.19.24 4.15.24  Not needed     BOMN                     Precautions: non-op protocol provided  Patient provided verbal consent to treatment plan and recommended interventions.    Prepared by: Junito Yancey    Access Code: [email or web address]  URL: [email or web address]  Date: 02/27/2024  Prepared by: Junito Yancey    Exercises  - Supine Ankle Inversion and Eversion AROM  - 2 x daily - 7 x weekly - 2 sets - 10  "reps  - Seated Heel Raise  - 2 x daily - 7 x weekly - 2 sets - 10 reps  - Supine Straight Leg Raises  - 1 x daily - 7 x weekly - 3 sets - 10 reps  - Sidelying Hip Abduction  - 1 x daily - 7 x weekly - 3 sets - 10 reps  - Prone Hip Extension  - 1 x daily - 7 x weekly - 3 sets - 10 reps  - Seated Ankle Inversion with Resistance and Legs Crossed  - 2 x daily - 7 x weekly - 3 sets - 10 reps  - Seated Ankle Eversion with Resistance  - 2 x daily - 7 x weekly - 3 sets - 10 reps  - Seated Ankle Plantarflexion with Resistance  - 2 x daily - 7 x weekly - 3 sets - 10 reps    Date on injury: 1/13/24    Manuals 2.19 2.27 3.4.24 3.12 3.19 3.26    visit 1 2 3 4 5 6                                  Neuro Re-Ed          SLS    4*20'' LLE with UE asst.  4*30'' with UE assist    Feet together EC     3*30''                         Ther Ex          Seated ankle ev/iv. 2*10 3*10 GTB        PF band  3*10 GTB Blue TB Black TB      Stand hip abd   3*10 RLE moving 3*10 RLE moving 3*10 RLE moving 3*10 RLE moving    Ant step up    2*10, 4\" LLE 10x, 6\" LLE 3*10, 8\"    Side stepping      4 laps 12'    Lateral step up      2*10, 4\"    Calf raise (mini)      2*10    Leg press      3*15, 75# neutral DF                        Ther Activity          Pt ed FB FB FB       Boot adjustment   FB       Gait Training          Single crutch  2'        No crutch   3'  Step back/forth 3*2' Step back/forth 3'    Modalities                                   "

## 2024-04-01 ENCOUNTER — OFFICE VISIT (OUTPATIENT)
Dept: PHYSICAL THERAPY | Facility: CLINIC | Age: 29
End: 2024-04-01
Payer: COMMERCIAL

## 2024-04-01 DIAGNOSIS — S86.012A ACHILLES TENDON RUPTURE, LEFT, INITIAL ENCOUNTER: Primary | ICD-10-CM

## 2024-04-01 PROCEDURE — 97530 THERAPEUTIC ACTIVITIES: CPT

## 2024-04-01 PROCEDURE — 97110 THERAPEUTIC EXERCISES: CPT

## 2024-04-01 PROCEDURE — 97112 NEUROMUSCULAR REEDUCATION: CPT

## 2024-04-01 NOTE — PROGRESS NOTES
Daily Note     Today's date: 2024  Patient name: Genevieve Mcqueen  : 1995  MRN: [id number]  Referring provider: Ed Forte DPM  Dx:   Encounter Diagnosis     ICD-10-CM    1. Achilles tendon rupture, left, initial encounter  S86.012A                    Subjective: Patient reports occasional heel pain       Objective: See treatment diary below    Assessment: Pt did well with all TE as listed. VCs to follow through heel to toe with ambulation.     Plan: add treadmill next visit.      Ancelmo/Brandan Achilles protocol for nonoperative treatment    0-2 weeks    Plaster cast with ankle in maximum passive plantar flexion; non-weight  bearing with crutches    2-4 weeks   Achilles-specific (or other) walking boot with maximum passive plantar-flexed  heel lifts  Protected weight bearing with crutches:  Weeks 2-3--25%  Weeks 3-4--50%  Weeks 4-5--75%  Weeks 5-6--100%  Active plantar and dorsiflexion ROM exercises to neutral, inversion/eversion  below neutral  Modalities to control swelling (ultrasound, interferential current with ice,  acupuncture, light/laser therapy)  Electrical muscle stimulation to calf musculature with seated heel raises when  tolerated.  Patients being seen 2-3 times/wk depending on availability and degree of  pain and swelling in the foot and ankle  Knee/hip exercises with no ankle involvement, for example, leg lifts from  sitting, prone, or side-lying  Non-weight-bearing fitness/cardio work, for example, biking with 1 leg (with  boot walker on), deep water running (usually not started until 3-4 wk point)  Hydrotherapy if available (within motion and weight-bearing limitations)  Emphasize need of patient to use pain as guideline. If in pain, back off  activities and weight bearing.    4-6 weeks   Continue weight bearing as tolerated  Continue 2-4 wk protocol  Progress electrical muscle stimulation to calf with lying calf raises on shuttle  with no resistance as tolerated approximately  weeks 5-6. Please ensure that  ankle does not go past neutral while doing exercises.  Continue with physiotherapy 2-3 times/wk.  Emphasize patient doing non-weight-bearing cardio activities as tolerated  with boot walker on.    6-8 weeks   Continue physiotherapy 2 times/week  Continue with modalities for swelling as needed.  Continue with electrical muscle stimulation on calf with strengthening  exercises. Do not go past neutral ankle position.  Remove heel lifts in stages dependent on Achilles length. Remove 1 lift daily  as tolerated. Always leave 1-2 lifts in to represent regular shoe lift, depending  on boot design.  Weight bearing as tolerated, usually 100% weight bearing in boot walker  now.  Graduated resistance exercises (open and closed kinetic chain as well as  functional activities)--start with resisted tubing exercises  With weighted-resisted exercises, do not go past neutral ankle position.  Gait retraining now that 100% weight bearing  Fitness/cardio to include weight bearing as tolerated, for example, biking  Hydrotherapy    8-12 weeks   Ensure patient understands that tendon is still very vulnerable, and patients  need to be diligent with activities of daily living and exercises. Any sudden  loading of the Achilles (trip, step up stairs, etc.) may result in a rerupture.  Wean off boot (usually over 2-5 d process--varies per patient), at night as well  Wear Achilles compression ankle brace to provide extra stability and swelling control once boot walker is removed.  Return to crutches/cane as necessary and gradually wean off. Have patient  always wear shoes, limiting time in bare/sock feet.  Continue to progress to ROM, strength, and proprioception exercises.  Add exercises, such as stationary bicycle, elliptical, and walking on treadmill,  as patient tolerates.  Add balance board activities--standing with block to prevent dorsiflexion  past neutral position.  Add calf stretches in standing (gently).  Do not allow ankle to go past neutral  position.  Add double-heel raises and progress to single-heel raises when tolerated. Do  not allow ankle to go past neutral position.  Continue physiotherapy 1-2 times/wk depending on how independent  patient is at doing exercises and access to exercise equipment.    12-16 weeks  Continue to progress ROM, strength, and proprioception exercises.  Retrain strength, power, endurance.  Ensure patient understands that tendon is still very vulnerable and patients  need to be diligent with activities of daily living and exercises. Avoid lunges,  squats, etc., because these places excessive stretch on tendon.    16+ weeks   Increase dynamic weight-bearing exercise, including sport-specific retaining  (ie, skipping, jogging, and weight training).    6-9 months  Return to normal sporting activities that do not involve contact or sprinting,  cutting jumping, etc., if patient has regained 80% strength    12 months   Return to sports that involve running/jumping as directed by medical team  and tolerated if patient has regained 100% strength.      Insurance:  AMA/CMS Eval/ Re-eval POC expires FOTO Auth #/ Referral # Total    Start date  Expiration date Extension  Visit limitation?  PT only or  PT+OT? Co-Insurance   CMS 2.19.24 4.15.24  Not needed     BOMN                     Precautions: non-op protocol provided  Patient provided verbal consent to treatment plan and recommended interventions.    Prepared by: Junito Yancey    Access Code: [email or web address]  URL: [email or web address]  Date: 02/27/2024  Prepared by: Junito Yancey    Exercises  - Supine Ankle Inversion and Eversion AROM  - 2 x daily - 7 x weekly - 2 sets - 10 reps  - Seated Heel Raise  - 2 x daily - 7 x weekly - 2 sets - 10 reps  - Supine Straight Leg Raises  - 1 x daily - 7 x weekly - 3 sets - 10 reps  - Sidelying Hip Abduction  - 1 x daily - 7 x weekly - 3 sets - 10 reps  - Prone Hip Extension  - 1 x daily - 7 x weekly - 3  "sets - 10 reps  - Seated Ankle Inversion with Resistance and Legs Crossed  - 2 x daily - 7 x weekly - 3 sets - 10 reps  - Seated Ankle Eversion with Resistance  - 2 x daily - 7 x weekly - 3 sets - 10 reps  - Seated Ankle Plantarflexion with Resistance  - 2 x daily - 7 x weekly - 3 sets - 10 reps    Date on injury: 1/13/24    Manuals 2.19 2.27 3.4.24 3.12 3.19 3.26 4.1   visit 1 2 3 4 5 6 7                                 Neuro Re-Ed          SLS    4*20'' LLE with UE asst.  4*30'' with UE assist 4*30\" w/ UE assist   Feet together EC     3*30''                         Ther Ex          Seated ankle ev/iv. 2*10 3*10 GTB        PF band  3*10 GTB Blue TB Black TB      Stand hip abd   3*10 RLE moving 3*10 RLE moving 3*10 RLE moving 3*10 RLE moving 2*10 3 way kicks   Ant step up    2*10, 4\" LLE 10x, 6\" LLE 3*10, 8\" 6\" reciprocal x6   Side stepping      4 laps 12' 4 laps 12'   Lateral step up      2*10, 4\" 2*10 4\"   Calf raise (mini)      2*10 2*10   Leg press      3*15, 75# neutral DF 3*15 75#   SL TM walking       0.5 mph 5'   Descending stairs       4\" x6   Ther Activity          Pt ed FB FB FB       Boot adjustment   FB       Gait Training          Single crutch  2'        No crutch   3'  Step back/forth 3*2' Step back/forth 3'    Modalities                                   "

## 2024-04-05 ENCOUNTER — OFFICE VISIT (OUTPATIENT)
Age: 29
End: 2024-04-05
Payer: COMMERCIAL

## 2024-04-05 VITALS
SYSTOLIC BLOOD PRESSURE: 137 MMHG | WEIGHT: 293 LBS | HEART RATE: 83 BPM | BODY MASS INDEX: 41.95 KG/M2 | HEIGHT: 70 IN | DIASTOLIC BLOOD PRESSURE: 93 MMHG

## 2024-04-05 DIAGNOSIS — S86.012D ACHILLES TENDON RUPTURE, LEFT, SUBSEQUENT ENCOUNTER: Primary | ICD-10-CM

## 2024-04-05 PROCEDURE — 99212 OFFICE O/P EST SF 10 MIN: CPT | Performed by: STUDENT IN AN ORGANIZED HEALTH CARE EDUCATION/TRAINING PROGRAM
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
## 2024-04-08 ENCOUNTER — OFFICE VISIT (OUTPATIENT)
Dept: PHYSICAL THERAPY | Facility: CLINIC | Age: 29
End: 2024-04-08
Payer: COMMERCIAL

## 2024-04-08 DIAGNOSIS — S86.012A ACHILLES TENDON RUPTURE, LEFT, INITIAL ENCOUNTER: Primary | ICD-10-CM

## 2024-04-08 PROCEDURE — 97110 THERAPEUTIC EXERCISES: CPT | Performed by: PHYSICAL THERAPIST

## 2024-04-08 NOTE — PROGRESS NOTES
Daily Note     Today's date: 2024  Patient name: Genevieve Mcqueen  : 1995  MRN: [id number]  Referring provider: Ed Forte DPM  Dx:   Encounter Diagnosis     ICD-10-CM    1. Achilles tendon rupture, left, initial encounter  S86.012A                  Subjective: Patient is 12 weeks from date of injury. Patient reports that she was instructed to take boot with her to [place] by podiatrist.  1 on 1 with PT for 18 minutes. Remaining time independent fitness program.    Objective: See treatment diary below    Assessment: Pt did well with treadmill progression. Able to perform reciprocal gait pattern ascending stairs today. Improved single leg control noted with SLS.    Plan: Continue per plan of care.      Ancelmo/Brandan Achilles protocol for nonoperative treatment    0-2 weeks    Plaster cast with ankle in maximum passive plantar flexion; non-weight  bearing with crutches    2-4 weeks   Achilles-specific (or other) walking boot with maximum passive plantar-flexed  heel lifts  Protected weight bearing with crutches:  Weeks 2-3--25%  Weeks 3-4--50%  Weeks 4-5--75%  Weeks 5-6--100%  Active plantar and dorsiflexion ROM exercises to neutral, inversion/eversion  below neutral  Modalities to control swelling (ultrasound, interferential current with ice,  acupuncture, light/laser therapy)  Electrical muscle stimulation to calf musculature with seated heel raises when  tolerated.  Patients being seen 2-3 times/wk depending on availability and degree of  pain and swelling in the foot and ankle  Knee/hip exercises with no ankle involvement, for example, leg lifts from  sitting, prone, or side-lying  Non-weight-bearing fitness/cardio work, for example, biking with 1 leg (with  boot walker on), deep water running (usually not started until 3-4 wk point)  Hydrotherapy if available (within motion and weight-bearing limitations)  Emphasize need of patient to use pain as guideline. If in pain, back off  activities and  weight bearing.    4-6 weeks   Continue weight bearing as tolerated  Continue 2-4 wk protocol  Progress electrical muscle stimulation to calf with lying calf raises on shuttle  with no resistance as tolerated approximately weeks 5-6. Please ensure that  ankle does not go past neutral while doing exercises.  Continue with physiotherapy 2-3 times/wk.  Emphasize patient doing non-weight-bearing cardio activities as tolerated  with boot walker on.    6-8 weeks   Continue physiotherapy 2 times/week  Continue with modalities for swelling as needed.  Continue with electrical muscle stimulation on calf with strengthening  exercises. Do not go past neutral ankle position.  Remove heel lifts in stages dependent on Achilles length. Remove 1 lift daily  as tolerated. Always leave 1-2 lifts in to represent regular shoe lift, depending  on boot design.  Weight bearing as tolerated, usually 100% weight bearing in boot walker  now.  Graduated resistance exercises (open and closed kinetic chain as well as  functional activities)--start with resisted tubing exercises  With weighted-resisted exercises, do not go past neutral ankle position.  Gait retraining now that 100% weight bearing  Fitness/cardio to include weight bearing as tolerated, for example, biking  Hydrotherapy    8-12 weeks   Ensure patient understands that tendon is still very vulnerable, and patients  need to be diligent with activities of daily living and exercises. Any sudden  loading of the Achilles (trip, step up stairs, etc.) may result in a rerupture.  Wean off boot (usually over 2-5 d process--varies per patient), at night as well  Wear Achilles compression ankle brace to provide extra stability and swelling control once boot walker is removed.  Return to crutches/cane as necessary and gradually wean off. Have patient  always wear shoes, limiting time in bare/sock feet.  Continue to progress to ROM, strength, and proprioception exercises.  Add exercises, such as  stationary bicycle, elliptical, and walking on treadmill,  as patient tolerates.  Add balance board activities--standing with block to prevent dorsiflexion  past neutral position.  Add calf stretches in standing (gently). Do not allow ankle to go past neutral  position.  Add double-heel raises and progress to single-heel raises when tolerated. Do  not allow ankle to go past neutral position.  Continue physiotherapy 1-2 times/wk depending on how independent  patient is at doing exercises and access to exercise equipment.    12-16 weeks  Continue to progress ROM, strength, and proprioception exercises.  Retrain strength, power, endurance.  Ensure patient understands that tendon is still very vulnerable and patients  need to be diligent with activities of daily living and exercises. Avoid lunges,  squats, etc., because these places excessive stretch on tendon.    16+ weeks   Increase dynamic weight-bearing exercise, including sport-specific retaining  (ie, skipping, jogging, and weight training).    6-9 months  Return to normal sporting activities that do not involve contact or sprinting,  cutting jumping, etc., if patient has regained 80% strength    12 months   Return to sports that involve running/jumping as directed by medical team  and tolerated if patient has regained 100% strength.    Insurance:  AMA/CMS Eval/ Re-eval POC expires FOTO Auth #/ Referral # Total    Start date  Expiration date Extension  Visit limitation?  PT only or  PT+OT? Co-Insurance   CMS 2.19.24 4.15.24  Not needed     BOMN                     Precautions: non-op protocol provided  Patient provided verbal consent to treatment plan and recommended interventions.    Prepared by: Junito Yancey    Access Code: [email or web address]  URL: [email or web address]  Date: 02/27/2024  Prepared by: Junito Yancey    Exercises  - Supine Ankle Inversion and Eversion AROM  - 2 x daily - 7 x weekly - 2 sets - 10 reps  - Seated Heel Raise  - 2 x daily - 7 x weekly  "- 2 sets - 10 reps  - Supine Straight Leg Raises  - 1 x daily - 7 x weekly - 3 sets - 10 reps  - Sidelying Hip Abduction  - 1 x daily - 7 x weekly - 3 sets - 10 reps  - Prone Hip Extension  - 1 x daily - 7 x weekly - 3 sets - 10 reps  - Seated Ankle Inversion with Resistance and Legs Crossed  - 2 x daily - 7 x weekly - 3 sets - 10 reps  - Seated Ankle Eversion with Resistance  - 2 x daily - 7 x weekly - 3 sets - 10 reps  - Seated Ankle Plantarflexion with Resistance  - 2 x daily - 7 x weekly - 3 sets - 10 reps    Date on injury: 1/13/24    Manuals 3.12 3.19 3.26 4.1 4.8.24   visit 4 5 6 7 8                           Neuro Re-Ed        SLS 4*20'' LLE with UE asst.  4*30'' with UE assist 4*30\" w/ UE assist 4*30\" w/ UE assist                   Ther Ex        Stand hip abd 3*10 RLE moving 3*10 RLE moving 3*10 RLE moving 2*10 3 way kicks 2*10 3 way kicks   Ant step up 2*10, 4\" LLE 10x, 6\" LLE 3*10, 8\" 6\" reciprocal x6    Side stepping   4 laps 12' 4 laps 12'    Lateral step up   2*10, 4\" 2*10 4\"    Calf raise (mini)   2*10 2*10 3*15   Leg press   3*15, 75# neutral DF 3*15 75# 3*15, 105#    Uni leg press     3*15, 75#   SL TM walking    0.5 mph 5' 0.5 mph 4', 4' regular   Descending stairs    4\" x6 2*10, 4\"   Calf raise 2 up, weight shift to left for lower     10x, 3\" lower                           Ther Activity        Pt ed        Gait Training        Single crutch        No crutch  Step back/forth 3*2' Step back/forth 3'     Modalities                             "

## 2024-04-15 ENCOUNTER — OFFICE VISIT (OUTPATIENT)
Dept: PHYSICAL THERAPY | Facility: CLINIC | Age: 29
End: 2024-04-15
Payer: COMMERCIAL

## 2024-04-15 DIAGNOSIS — S86.012A ACHILLES TENDON RUPTURE, LEFT, INITIAL ENCOUNTER: Primary | ICD-10-CM

## 2024-04-15 PROCEDURE — 97110 THERAPEUTIC EXERCISES: CPT | Performed by: PHYSICAL THERAPIST

## 2024-04-15 NOTE — PROGRESS NOTES
Daily Note     Today's date: 4/15/2024  Patient name: Genevieve Mcqueen  : 1995  MRN: [id number]  Referring provider: Ed Forte DPM  Dx:   Encounter Diagnosis     ICD-10-CM    1. Achilles tendon rupture, left, initial encounter  S86.012A                  Subjective: Patient is 13 weeks from date of injury.  Patient reports occasional medial heel pain.   1 on 1 with PT for 18 minutes. Remaining time independent fitness program.    Objective: See treatment diary below  Improved gait pattern demonstrated.     Assessment: Pt reported feeling okay with treatment progression today. Difficulty with trying to progress to single leg calf lowering from elevated position. Good acceptance with single leg leg press exercise.     Plan: Continue per plan of care.      Ancelmo/Brandan Achilles protocol for nonoperative treatment    0-2 weeks    Plaster cast with ankle in maximum passive plantar flexion; non-weight  bearing with crutches    2-4 weeks   Achilles-specific (or other) walking boot with maximum passive plantar-flexed  heel lifts  Protected weight bearing with crutches:  Weeks 2-3--25%  Weeks 3-4--50%  Weeks 4-5--75%  Weeks 5-6--100%  Active plantar and dorsiflexion ROM exercises to neutral, inversion/eversion  below neutral  Modalities to control swelling (ultrasound, interferential current with ice,  acupuncture, light/laser therapy)  Electrical muscle stimulation to calf musculature with seated heel raises when  tolerated.  Patients being seen 2-3 times/wk depending on availability and degree of  pain and swelling in the foot and ankle  Knee/hip exercises with no ankle involvement, for example, leg lifts from  sitting, prone, or side-lying  Non-weight-bearing fitness/cardio work, for example, biking with 1 leg (with  boot walker on), deep water running (usually not started until 3-4 wk point)  Hydrotherapy if available (within motion and weight-bearing limitations)  Emphasize need of patient to use pain  as guideline. If in pain, back off  activities and weight bearing.    4-6 weeks   Continue weight bearing as tolerated  Continue 2-4 wk protocol  Progress electrical muscle stimulation to calf with lying calf raises on shuttle  with no resistance as tolerated approximately weeks 5-6. Please ensure that  ankle does not go past neutral while doing exercises.  Continue with physiotherapy 2-3 times/wk.  Emphasize patient doing non-weight-bearing cardio activities as tolerated  with boot walker on.    6-8 weeks   Continue physiotherapy 2 times/week  Continue with modalities for swelling as needed.  Continue with electrical muscle stimulation on calf with strengthening  exercises. Do not go past neutral ankle position.  Remove heel lifts in stages dependent on Achilles length. Remove 1 lift daily  as tolerated. Always leave 1-2 lifts in to represent regular shoe lift, depending  on boot design.  Weight bearing as tolerated, usually 100% weight bearing in boot walker  now.  Graduated resistance exercises (open and closed kinetic chain as well as  functional activities)--start with resisted tubing exercises  With weighted-resisted exercises, do not go past neutral ankle position.  Gait retraining now that 100% weight bearing  Fitness/cardio to include weight bearing as tolerated, for example, biking  Hydrotherapy    8-12 weeks   Ensure patient understands that tendon is still very vulnerable, and patients  need to be diligent with activities of daily living and exercises. Any sudden  loading of the Achilles (trip, step up stairs, etc.) may result in a rerupture.  Wean off boot (usually over 2-5 d process--varies per patient), at night as well  Wear Achilles compression ankle brace to provide extra stability and swelling control once boot walker is removed.  Return to crutches/cane as necessary and gradually wean off. Have patient  always wear shoes, limiting time in bare/sock feet.  Continue to progress to ROM, strength,  and proprioception exercises.  Add exercises, such as stationary bicycle, elliptical, and walking on treadmill,  as patient tolerates.  Add balance board activities--standing with block to prevent dorsiflexion  past neutral position.  Add calf stretches in standing (gently). Do not allow ankle to go past neutral  position.  Add double-heel raises and progress to single-heel raises when tolerated. Do  not allow ankle to go past neutral position.  Continue physiotherapy 1-2 times/wk depending on how independent  patient is at doing exercises and access to exercise equipment.    12-16 weeks  Continue to progress ROM, strength, and proprioception exercises.  Retrain strength, power, endurance.  Ensure patient understands that tendon is still very vulnerable and patients  need to be diligent with activities of daily living and exercises. Avoid lunges,  squats, etc., because these places excessive stretch on tendon.    16+ weeks   Increase dynamic weight-bearing exercise, including sport-specific retaining  (ie, skipping, jogging, and weight training).    6-9 months  Return to normal sporting activities that do not involve contact or sprinting,  cutting jumping, etc., if patient has regained 80% strength    12 months   Return to sports that involve running/jumping as directed by medical team  and tolerated if patient has regained 100% strength.    Insurance:  AMA/CMS Eval/ Re-eval POC expires FOTO Auth #/ Referral # Total    Start date  Expiration date Extension  Visit limitation?  PT only or  PT+OT? Co-Insurance   CMS 2.19.24 4.15.24  Not needed     BOMN                     Precautions: non-op protocol provided  Patient provided verbal consent to treatment plan and recommended interventions.    Prepared by: Junito Yancey    Access Code: [email or web address]  URL: [email or web address]  Date: 02/27/2024  Prepared by: Junito Yancey    Exercises  - Supine Ankle Inversion and Eversion AROM  - 2 x daily - 7 x weekly - 2 sets -  "10 reps  - Seated Heel Raise  - 2 x daily - 7 x weekly - 2 sets - 10 reps  - Supine Straight Leg Raises  - 1 x daily - 7 x weekly - 3 sets - 10 reps  - Sidelying Hip Abduction  - 1 x daily - 7 x weekly - 3 sets - 10 reps  - Prone Hip Extension  - 1 x daily - 7 x weekly - 3 sets - 10 reps  - Seated Ankle Inversion with Resistance and Legs Crossed  - 2 x daily - 7 x weekly - 3 sets - 10 reps  - Seated Ankle Eversion with Resistance  - 2 x daily - 7 x weekly - 3 sets - 10 reps  - Seated Ankle Plantarflexion with Resistance  - 2 x daily - 7 x weekly - 3 sets - 10 reps    Date on injury: 1/13/24    Manuals 3.19 3.26 4.1 4.8.24 4.15.24   visit 5 6 7 8 9                           Neuro Re-Ed        SLS  4*30'' with UE assist 4*30\" w/ UE assist 4*30\" w/ UE assist 4*30\" w/ UE assist                   Ther Ex        Stand hip abd 3*10 RLE moving 3*10 RLE moving 2*10 3 way kicks 2*10 3 way kicks 3*10 3 way kicks   Ant step up 10x, 6\" LLE 3*10, 8\" 6\" reciprocal x6     Side stepping  4 laps 12' 4 laps 12'     Lateral step up  2*10, 4\" 2*10 4\"  2*10, 4\"   Calf raise (mini)  2*10 2*10 3*15 3*10   Leg press  3*15, 75# neutral DF 3*15 75# 3*15, 105#  3*10, 125#   Uni leg press    3*15, 75# 3*15, 75#   Uni calf raise leg press     3*10, 65#   SL TM walking   0.5 mph 5' 0.5 mph 4', 4' regular 5' reg.    Descending stairs   4\" x6 2*10, 4\"    Calf raise 2 up, weight shift to left for lower    10x, 3\" lower 3*10, 3\" lower                           Ther Activity        Pt ed        Gait Training        Single crutch        No crutch Step back/forth 3*2' Step back/forth 3'      Modalities                             "

## 2024-04-22 ENCOUNTER — APPOINTMENT (OUTPATIENT)
Dept: PHYSICAL THERAPY | Facility: CLINIC | Age: 29
End: 2024-04-22
Payer: COMMERCIAL

## 2024-05-02 ENCOUNTER — OFFICE VISIT (OUTPATIENT)
Dept: PHYSICAL THERAPY | Facility: CLINIC | Age: 29
End: 2024-05-02
Payer: COMMERCIAL

## 2024-05-02 ENCOUNTER — OFFICE VISIT (OUTPATIENT)
Age: 29
End: 2024-05-02
Payer: COMMERCIAL

## 2024-05-02 VITALS
SYSTOLIC BLOOD PRESSURE: 129 MMHG | HEART RATE: 88 BPM | WEIGHT: 293 LBS | BODY MASS INDEX: 41.95 KG/M2 | HEIGHT: 70 IN | DIASTOLIC BLOOD PRESSURE: 84 MMHG

## 2024-05-02 DIAGNOSIS — S86.012D ACHILLES TENDON RUPTURE, LEFT, SUBSEQUENT ENCOUNTER: Primary | ICD-10-CM

## 2024-05-02 DIAGNOSIS — S86.012A ACHILLES TENDON RUPTURE, LEFT, INITIAL ENCOUNTER: Primary | ICD-10-CM

## 2024-05-02 PROCEDURE — 97110 THERAPEUTIC EXERCISES: CPT | Performed by: PHYSICAL THERAPIST

## 2024-05-02 PROCEDURE — 99213 OFFICE O/P EST LOW 20 MIN: CPT | Performed by: STUDENT IN AN ORGANIZED HEALTH CARE EDUCATION/TRAINING PROGRAM

## 2024-05-02 RX ORDER — ASPIRIN 81 MG/1
81 TABLET, CHEWABLE ORAL DAILY
COMMUNITY

## 2024-05-02 NOTE — PROGRESS NOTES
Daily Note     Today's date: 2024  Patient name: Genevieve Mcqueen  : 1995  MRN: [id number]  Referring provider: Ed Forte DPM  Dx:   Encounter Diagnosis     ICD-10-CM    1. Achilles tendon rupture, left, initial encounter  S86.012A                  Subjective: Patient is nearly 16 weeks for date of injury. Reports no trouble with ascending stairs but performing step to gait pattern descending steps when at her apartment (5 flights).   1 on 1 with PT for 18 minutes. Remaining time independent fitness program.    Objective: See treatment diary below  Patient demonstrating improved gait pattern    Assessment: Pt reported feeling heel discomfort when trying to performing single leg calf raise exercise. Still lacking full calf strength at this time. Added single leg calf raise to HEP with RLE assist. Patient will be away for nearly 2 weeks and will return to therapy at that time.     Plan: 6-8 weeks progressing strengthening per patient's tolerance.      Ancelmo/Rubinger Achilles protocol for nonoperative treatment    0-2 weeks    Plaster cast with ankle in maximum passive plantar flexion; non-weight  bearing with crutches    2-4 weeks   Achilles-specific (or other) walking boot with maximum passive plantar-flexed  heel lifts  Protected weight bearing with crutches:  Weeks 2-3--25%  Weeks 3-4--50%  Weeks 4-5--75%  Weeks 5-6--100%  Active plantar and dorsiflexion ROM exercises to neutral, inversion/eversion  below neutral  Modalities to control swelling (ultrasound, interferential current with ice,  acupuncture, light/laser therapy)  Electrical muscle stimulation to calf musculature with seated heel raises when  tolerated.  Patients being seen 2-3 times/wk depending on availability and degree of  pain and swelling in the foot and ankle  Knee/hip exercises with no ankle involvement, for example, leg lifts from  sitting, prone, or side-lying  Non-weight-bearing fitness/cardio work, for example, biking  with 1 leg (with  boot walker on), deep water running (usually not started until 3-4 wk point)  Hydrotherapy if available (within motion and weight-bearing limitations)  Emphasize need of patient to use pain as guideline. If in pain, back off  activities and weight bearing.    4-6 weeks   Continue weight bearing as tolerated  Continue 2-4 wk protocol  Progress electrical muscle stimulation to calf with lying calf raises on shuttle  with no resistance as tolerated approximately weeks 5-6. Please ensure that  ankle does not go past neutral while doing exercises.  Continue with physiotherapy 2-3 times/wk.  Emphasize patient doing non-weight-bearing cardio activities as tolerated  with boot walker on.    6-8 weeks   Continue physiotherapy 2 times/week  Continue with modalities for swelling as needed.  Continue with electrical muscle stimulation on calf with strengthening  exercises. Do not go past neutral ankle position.  Remove heel lifts in stages dependent on Achilles length. Remove 1 lift daily  as tolerated. Always leave 1-2 lifts in to represent regular shoe lift, depending  on boot design.  Weight bearing as tolerated, usually 100% weight bearing in boot walker  now.  Graduated resistance exercises (open and closed kinetic chain as well as  functional activities)--start with resisted tubing exercises  With weighted-resisted exercises, do not go past neutral ankle position.  Gait retraining now that 100% weight bearing  Fitness/cardio to include weight bearing as tolerated, for example, biking  Hydrotherapy    8-12 weeks   Ensure patient understands that tendon is still very vulnerable, and patients  need to be diligent with activities of daily living and exercises. Any sudden  loading of the Achilles (trip, step up stairs, etc.) may result in a rerupture.  Wean off boot (usually over 2-5 d process--varies per patient), at night as well  Wear Achilles compression ankle brace to provide extra stability and swelling  control once boot walker is removed.  Return to crutches/cane as necessary and gradually wean off. Have patient  always wear shoes, limiting time in bare/sock feet.  Continue to progress to ROM, strength, and proprioception exercises.  Add exercises, such as stationary bicycle, elliptical, and walking on treadmill,  as patient tolerates.  Add balance board activities--standing with block to prevent dorsiflexion  past neutral position.  Add calf stretches in standing (gently). Do not allow ankle to go past neutral  position.  Add double-heel raises and progress to single-heel raises when tolerated. Do  not allow ankle to go past neutral position.  Continue physiotherapy 1-2 times/wk depending on how independent  patient is at doing exercises and access to exercise equipment.    12-16 weeks  Continue to progress ROM, strength, and proprioception exercises.  Retrain strength, power, endurance.  Ensure patient understands that tendon is still very vulnerable and patients  need to be diligent with activities of daily living and exercises. Avoid lunges,  squats, etc., because these places excessive stretch on tendon.    16+ weeks   Increase dynamic weight-bearing exercise, including sport-specific retaining  (ie, skipping, jogging, and weight training).    6-9 months  Return to normal sporting activities that do not involve contact or sprinting,  cutting jumping, etc., if patient has regained 80% strength    12 months   Return to sports that involve running/jumping as directed by medical team  and tolerated if patient has regained 100% strength.    Insurance:  AMA/CMS Eval/ Re-eval POC expires FOTO Auth #/ Referral # Total    Start date  Expiration date Extension  Visit limitation?  PT only or  PT+OT? Co-Insurance   CMS 2.19.24 4.15.24  Not needed     BOMN     CMS 5.2.24 6.13.24              Precautions: non-op protocol provided  Patient provided verbal consent to treatment plan and recommended interventions.    Prepared  "by: Junito Yancey    Access Code: [email or web address]  URL: [email or web address]  Date: 02/27/2024  Prepared by: Junito Yancey    Exercises  - Supine Ankle Inversion and Eversion AROM  - 2 x daily - 7 x weekly - 2 sets - 10 reps  - Seated Heel Raise  - 2 x daily - 7 x weekly - 2 sets - 10 reps  - Supine Straight Leg Raises  - 1 x daily - 7 x weekly - 3 sets - 10 reps  - Sidelying Hip Abduction  - 1 x daily - 7 x weekly - 3 sets - 10 reps  - Prone Hip Extension  - 1 x daily - 7 x weekly - 3 sets - 10 reps  - Seated Ankle Inversion with Resistance and Legs Crossed  - 2 x daily - 7 x weekly - 3 sets - 10 reps  - Seated Ankle Eversion with Resistance  - 2 x daily - 7 x weekly - 3 sets - 10 reps  - Seated Ankle Plantarflexion with Resistance  - 2 x daily - 7 x weekly - 3 sets - 10 reps    Date on injury: 1/13/24    Manuals 3.26 4.1 4.8.24 4.15.24 5.2.24   visit 6 7 8 9 10                           Neuro Re-Ed        SLS 4*30'' with UE assist 4*30\" w/ UE assist 4*30\" w/ UE assist 4*30\" w/ UE assist 4*30''                    Ther Ex        Stand hip abd 3*10 RLE moving 2*10 3 way kicks 2*10 3 way kicks 3*10 3 way kicks    Ant step up 3*10, 8\" 6\" reciprocal x6      Side stepping 4 laps 12' 4 laps 12'      Lateral step up 2*10, 4\" 2*10 4\"  2*10, 4\"    Calf raise (mini) 2*10 2*10 3*15 3*10 3*10, attempting unilateral   Leg press 3*15, 75# neutral DF 3*15 75# 3*15, 105#  3*10, 125# 3*10, 125#   Uni leg press   3*15, 75# 3*15, 75#    Uni calf raise leg press    3*10, 65#    SL TM walking  0.5 mph 5' 0.5 mph 4', 4' regular 5' reg.  5' reg.    Descending stairs  4\" x6 2*10, 4\"     Calf raise 2 up, weight shift to left for lower   10x, 3\" lower 3*10, 3\" lower 3*10, 3\" lower                           Ther Activity        Pt ed        Gait Training        Single crutch        No crutch Step back/forth 3'       Modalities                             "
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
## 2024-05-13 ENCOUNTER — APPOINTMENT (OUTPATIENT)
Dept: PHYSICAL THERAPY | Facility: CLINIC | Age: 29
End: 2024-05-13
Payer: COMMERCIAL

## 2024-05-14 ENCOUNTER — OFFICE VISIT (OUTPATIENT)
Dept: PHYSICAL THERAPY | Facility: CLINIC | Age: 29
End: 2024-05-14
Payer: COMMERCIAL

## 2024-05-14 DIAGNOSIS — S86.012A ACHILLES TENDON RUPTURE, LEFT, INITIAL ENCOUNTER: Primary | ICD-10-CM

## 2024-05-14 PROCEDURE — 97110 THERAPEUTIC EXERCISES: CPT | Performed by: PHYSICAL THERAPIST

## 2024-05-14 NOTE — PROGRESS NOTES
Daily Note     Today's date: 2024  Patient name: Genevieve Mcqueen  : 1995  MRN: [id number]  Referring provider: Ed Forte DPM  Dx:   Encounter Diagnosis     ICD-10-CM    1. Achilles tendon rupture, left, initial encounter  S86.012A                  Subjective: Patient is just over 17 weeks from date of injury. Patient reports calf tightness that started yesterday.   1 on 1 with PT for 20 minutes.     Objective: See treatment diary below  Patient demonstrating improved gait pattern    Assessment: Pt reported some tightness in calf region with anterior step downs and squats added today. Added standing calf raise to HEP. Fair to good PROM ankle dorsiflexion.     Plan: 6-8 weeks progressing strengthening per patient's tolerance.      Ancelmo/Brandan Achilles protocol for nonoperative treatment    8-12 weeks   Ensure patient understands that tendon is still very vulnerable, and patients  need to be diligent with activities of daily living and exercises. Any sudden  loading of the Achilles (trip, step up stairs, etc.) may result in a rerupture.  Wean off boot (usually over 2-5 d process--varies per patient), at night as well  Wear Achilles compression ankle brace to provide extra stability and swelling control once boot walker is removed.  Return to crutches/cane as necessary and gradually wean off. Have patient  always wear shoes, limiting time in bare/sock feet.  Continue to progress to ROM, strength, and proprioception exercises.  Add exercises, such as stationary bicycle, elliptical, and walking on treadmill,  as patient tolerates.  Add balance board activities--standing with block to prevent dorsiflexion  past neutral position.  Add calf stretches in standing (gently). Do not allow ankle to go past neutral  position.  Add double-heel raises and progress to single-heel raises when tolerated. Do  not allow ankle to go past neutral position.  Continue physiotherapy 1-2 times/wk depending on how  independent  patient is at doing exercises and access to exercise equipment.    12-16 weeks  Continue to progress ROM, strength, and proprioception exercises.  Retrain strength, power, endurance.  Ensure patient understands that tendon is still very vulnerable and patients  need to be diligent with activities of daily living and exercises. Avoid lunges,  squats, etc., because these places excessive stretch on tendon.    16+ weeks   Increase dynamic weight-bearing exercise, including sport-specific retaining  (ie, skipping, jogging, and weight training).    6-9 months  Return to normal sporting activities that do not involve contact or sprinting,  cutting jumping, etc., if patient has regained 80% strength    12 months   Return to sports that involve running/jumping as directed by medical team  and tolerated if patient has regained 100% strength.    Insurance:  AMA/CMS Eval/ Re-eval POC expires FOTO Auth #/ Referral # Total    Start date  Expiration date Extension  Visit limitation?  PT only or  PT+OT? Co-Insurance   CMS 2.19.24 4.15.24  Not needed     BOMN     CMS 5.2.24 6.13.24              Precautions: non-op protocol provided  Patient provided verbal consent to treatment plan and recommended interventions.    Prepared by: Junito Yancey    Access Code: [email or web address]  URL: [email or web address]  Date: 02/27/2024  Prepared by: Junito Yancey    Exercises  - Supine Ankle Inversion and Eversion AROM  - 2 x daily - 7 x weekly - 2 sets - 10 reps  - Seated Heel Raise  - 2 x daily - 7 x weekly - 2 sets - 10 reps  - Supine Straight Leg Raises  - 1 x daily - 7 x weekly - 3 sets - 10 reps  - Sidelying Hip Abduction  - 1 x daily - 7 x weekly - 3 sets - 10 reps  - Prone Hip Extension  - 1 x daily - 7 x weekly - 3 sets - 10 reps  - Seated Ankle Inversion with Resistance and Legs Crossed  - 2 x daily - 7 x weekly - 3 sets - 10 reps  - Seated Ankle Eversion with Resistance  - 2 x daily - 7 x weekly - 3 sets - 10 reps  - Seated  "Ankle Plantarflexion with Resistance  - 2 x daily - 7 x weekly - 3 sets - 10 reps    Date on injury: 1/13/24    Manuals 4.1 4.8.24 4.15.24 5.2.24 5.14.24   visit 7 8 9 10 11                           Neuro Re-Ed        SLS 4*30\" w/ UE assist 4*30\" w/ UE assist 4*30\" w/ UE assist 4*30''                     Ther Ex        Stand hip abd 2*10 3 way kicks 2*10 3 way kicks 3*10 3 way kicks     Stand calf stretch     5*10'' LLE behind   Side stepping 4 laps 12'       Lateral step up 2*10 4\"  2*10, 4\"     Calf raise (mini) 2*10 3*15 3*10 3*10, attempting unilateral    Leg press 3*15 75# 3*15, 105#  3*10, 125# 3*10, 125#    Uni leg press  3*15, 75# 3*15, 75#     Uni calf raise leg press   3*10, 65#     SL TM walking 0.5 mph 5' 0.5 mph 4', 4' regular 5' reg.  5' reg.     Descending stairs 4\" x6 2*10, 4\"   2*10, 4\"   Calf raise 2 up, weight shift to left for lower  10x, 3\" lower 3*10, 3\" lower 3*10, 3\" lower 1*10, 3\" lower   Fwd heel tap     2*10   squats     2*10           Ther Activity        Pt ed        Gait Training        Single crutch        No crutch        Modalities                             "

## 2024-05-23 ENCOUNTER — OFFICE VISIT (OUTPATIENT)
Dept: PHYSICAL THERAPY | Facility: CLINIC | Age: 29
End: 2024-05-23
Payer: COMMERCIAL

## 2024-05-23 DIAGNOSIS — S86.012A ACHILLES TENDON RUPTURE, LEFT, INITIAL ENCOUNTER: Primary | ICD-10-CM

## 2024-05-23 PROCEDURE — 97110 THERAPEUTIC EXERCISES: CPT

## 2024-05-23 NOTE — PROGRESS NOTES
Daily Note     Today's date: 2024  Patient name: Genevieve Mcqueen  : 1995  MRN: [id number]  Referring provider: Ed Forte DPM  Dx:   Encounter Diagnosis     ICD-10-CM    1. Achilles tendon rupture, left, initial encounter  S86.012A                  Subjective: Patient is moving back to [place] after today's session. She has already scheduled with a new PT in her area.   1 on 1 with PT for 20 minutes.     Objective: See treatment diary below  Patient demonstrating improved mechanics when descending steps.    Assessment: Cued patient on eccentric knee flexion when descending steps and added eccentric step downs, which improved her step mechanics. Patient had no concerns/questions at the end of session and was given contact card for her new PT to reach out.     Plan: 6-8 weeks progressing strengthening per patient's tolerance. Patient will be continuing PT in [place].      Ancelmo/Brandan Achilles protocol for nonoperative treatment    8-12 weeks   Ensure patient understands that tendon is still very vulnerable, and patients  need to be diligent with activities of daily living and exercises. Any sudden  loading of the Achilles (trip, step up stairs, etc.) may result in a rerupture.  Wean off boot (usually over 2-5 d process--varies per patient), at night as well  Wear Achilles compression ankle brace to provide extra stability and swelling control once boot walker is removed.  Return to crutches/cane as necessary and gradually wean off. Have patient  always wear shoes, limiting time in bare/sock feet.  Continue to progress to ROM, strength, and proprioception exercises.  Add exercises, such as stationary bicycle, elliptical, and walking on treadmill,  as patient tolerates.  Add balance board activities--standing with block to prevent dorsiflexion  past neutral position.  Add calf stretches in standing (gently). Do not allow ankle to go past neutral  position.  Add double-heel raises and progress  to single-heel raises when tolerated. Do  not allow ankle to go past neutral position.  Continue physiotherapy 1-2 times/wk depending on how independent  patient is at doing exercises and access to exercise equipment.    12-16 weeks  Continue to progress ROM, strength, and proprioception exercises.  Retrain strength, power, endurance.  Ensure patient understands that tendon is still very vulnerable and patients  need to be diligent with activities of daily living and exercises. Avoid lunges,  squats, etc., because these places excessive stretch on tendon.    16+ weeks   Increase dynamic weight-bearing exercise, including sport-specific retaining  (ie, skipping, jogging, and weight training).    6-9 months  Return to normal sporting activities that do not involve contact or sprinting,  cutting jumping, etc., if patient has regained 80% strength    12 months   Return to sports that involve running/jumping as directed by medical team  and tolerated if patient has regained 100% strength.    Insurance:  AMA/CMS Eval/ Re-eval POC expires FOTO Auth #/ Referral # Total    Start date  Expiration date Extension  Visit limitation?  PT only or  PT+OT? Co-Insurance   CMS 2.19.24 4.15.24  Not needed     BOMN     CMS 5.2.24 6.13.24              Precautions: non-op protocol provided  Patient provided verbal consent to treatment plan and recommended interventions.    Prepared by: Junito Yancey    Access Code: [email or web address]  URL: [email or web address]  Date: 02/27/2024  Prepared by: Junito Yancey    Exercises  - Supine Ankle Inversion and Eversion AROM  - 2 x daily - 7 x weekly - 2 sets - 10 reps  - Seated Heel Raise  - 2 x daily - 7 x weekly - 2 sets - 10 reps  - Supine Straight Leg Raises  - 1 x daily - 7 x weekly - 3 sets - 10 reps  - Sidelying Hip Abduction  - 1 x daily - 7 x weekly - 3 sets - 10 reps  - Prone Hip Extension  - 1 x daily - 7 x weekly - 3 sets - 10 reps  - Seated Ankle Inversion with Resistance and Legs Crossed  " - 2 x daily - 7 x weekly - 3 sets - 10 reps  - Seated Ankle Eversion with Resistance  - 2 x daily - 7 x weekly - 3 sets - 10 reps  - Seated Ankle Plantarflexion with Resistance  - 2 x daily - 7 x weekly - 3 sets - 10 reps    Date on injury: 1/13/24    Manuals 4.8.24 4.15.24 5.2.24 5.14.24 5.23.24   visit 8 9 10 11 12                           Neuro Re-Ed        SLS 4*30\" w/ UE assist 4*30\" w/ UE assist 4*30''                      Ther Ex        Stand hip abd 2*10 3 way kicks 3*10 3 way kicks      Stand calf stretch    5*10'' LLE behind 5* 20 LLE behind   Lateral step up  2*10, 4\"      Calf raise (mini) 3*15 3*10 3*10, attempting unilateral     Leg press 3*15, 105#  3*10, 125# 3*10, 125#  3*10 125#   Uni leg press 3*15, 75# 3*15, 75#      Uni calf raise leg press  3*10, 65#      SL TM walking 0.5 mph 4', 4' regular 5' reg.  5' reg.      Descending stairs 2*10, 4\"   2*10, 4\" 2*10 6\"    Ecc lowering 2*10 4\"   Calf raise 2 up, weight shift to left for lower 10x, 3\" lower 3*10, 3\" lower 3*10, 3\" lower 1*10, 3\" lower 2*10    Fwd heel tap    2*10 2*10    squats    2*10 2*10           Ther Activity        Pt ed        Gait Training        Single crutch        No crutch        Modalities                             "

## 2024-05-23 NOTE — LETTER
May 30, 2024     Patient: Genevieve Mcqueen  YOB: 1995  Date of Visit: 5/23/2024      To Whom it May Concern:    Genevieve Mcqueen was under my professional care. Genevieve has been discharged from physical therapy at this location and will continue with care at a location closer to home.    If you have any questions or concerns, please don't hesitate to call.          Sincerely,          Junito Yancey PT, DPT        CC:

## 2024-05-28 ENCOUNTER — TELEPHONE (OUTPATIENT)
Age: 29
End: 2024-05-28

## 2024-05-28 NOTE — TELEPHONE ENCOUNTER
Caller: Patient     Doctor: Reanna     Reason for call: Requesting a physical therapy order script to be seen in [place] for PT     Please advise     Call back#: [phone]

## 2024-05-29 ENCOUNTER — TELEPHONE (OUTPATIENT)
Age: 29
End: 2024-05-29

## 2024-05-29 DIAGNOSIS — S86.012D ACHILLES TENDON RUPTURE, LEFT, SUBSEQUENT ENCOUNTER: Primary | ICD-10-CM

## 2024-05-30 ENCOUNTER — TELEPHONE (OUTPATIENT)
Age: 29
End: 2024-05-30

## 2024-05-30 NOTE — PROGRESS NOTES
Patient has been discharged from therapy at this time location. She will continue at another location.

## 2024-05-30 NOTE — TELEPHONE ENCOUNTER
Caller: [facility] - Marilyn Lauy    Doctor: Reanna    Reason for call: [facility] needs an updated referral for PT for this patient.  Can we please fax them one? Thank you.    Call back#: FAX: [phone]

## 2024-07-26 ENCOUNTER — OFFICE VISIT (OUTPATIENT)
Age: 29
End: 2024-07-26
Payer: COMMERCIAL

## 2024-07-26 VITALS
BODY MASS INDEX: 41.95 KG/M2 | HEART RATE: 102 BPM | SYSTOLIC BLOOD PRESSURE: 140 MMHG | WEIGHT: 293 LBS | HEIGHT: 70 IN | DIASTOLIC BLOOD PRESSURE: 93 MMHG

## 2024-07-26 DIAGNOSIS — S86.012D ACHILLES TENDON RUPTURE, LEFT, SUBSEQUENT ENCOUNTER: Primary | ICD-10-CM

## 2024-07-26 PROCEDURE — 99212 OFFICE O/P EST SF 10 MIN: CPT | Performed by: STUDENT IN AN ORGANIZED HEALTH CARE EDUCATION/TRAINING PROGRAM
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
## 2024-12-23 ENCOUNTER — APPOINTMENT (OUTPATIENT)
Dept: RADIOLOGY | Facility: CLINIC | Age: 29
End: 2024-12-23
Payer: COMMERCIAL

## 2024-12-23 ENCOUNTER — OFFICE VISIT (OUTPATIENT)
Age: 29
End: 2024-12-23
Payer: COMMERCIAL

## 2024-12-23 VITALS — HEIGHT: 70 IN | WEIGHT: 293 LBS | BODY MASS INDEX: 41.95 KG/M2

## 2024-12-23 DIAGNOSIS — S92.342A CLOSED DISPLACED FRACTURE OF FOURTH METATARSAL BONE OF LEFT FOOT, INITIAL ENCOUNTER: ICD-10-CM

## 2024-12-23 DIAGNOSIS — M79.672 LEFT FOOT PAIN: ICD-10-CM

## 2024-12-23 DIAGNOSIS — M79.672 LEFT FOOT PAIN: Primary | ICD-10-CM

## 2024-12-23 PROCEDURE — 73630 X-RAY EXAM OF FOOT: CPT

## 2024-12-23 PROCEDURE — 99213 OFFICE O/P EST LOW 20 MIN: CPT | Performed by: STUDENT IN AN ORGANIZED HEALTH CARE EDUCATION/TRAINING PROGRAM

## 2024-12-27 NOTE — PROGRESS NOTES
"[facility] Podiatric Medicine and Surgery Office Visit    ASSESSMENT     Diagnoses and all orders for this visit:    Left foot pain  -     XR foot 3+ vw left; Future  -     Post Op Shoe    Closed displaced fracture of fourth metatarsal bone of left foot, initial encounter         Problem List Items Addressed This Visit          Surgery/Wound/Pain    Left foot pain - Primary    Relevant Orders    XR foot 3+ vw left (Completed)    Post Op Shoe     Other Visit Diagnoses         Closed displaced fracture of fourth metatarsal bone of left foot, initial encounter              PLAN  -Patient was educated regarding their condition.  -Recommend minimal weightbearing to the left foot in a CAM boot vs surgical shoe for now  -Patient is traveling back to [facility] shortly, she will seek repeat evaluation in the city    -X-ray from 12/23/2023 of left foot reviewed: Minimally displaced fracture of the fourth metatarsal noted at the neck. I note medial angulation of the distal fragment.  Increased soft-tissue density and volume noted dorsal to the fracture site.      SUBJECTIVE    Chief Complaint:  Left foot pain     Patient ID: Genevieve Mcqueen     12/23/2024: Genevieve presents today for evaluation of her left foot.  She states that she missed stepped the other night and felt immediate pain to her forefoot.  She states that there is swelling as well as some bruising to the area.        The following portions of the patient's history were reviewed and updated as appropriate: allergies, current medications, past family history, past medical history, past social history, past surgical history and problem list.    Review of Systems   Constitutional: Negative.    HENT: Negative.     Respiratory: Negative.     Cardiovascular: Negative.    Gastrointestinal: Negative.    Musculoskeletal:  Positive for arthralgias.   Skin: Negative.    Neurological: Negative.          OBJECTIVE      Ht 5' 10\" (1.778 m)   Wt (!) 163 kg (360 lb)   " BMI 51.65 kg/m²        Physical Exam  Constitutional:       Appearance: Normal appearance.   HENT:      Head: Normocephalic and atraumatic.   Eyes:      General:         Right eye: No discharge.         Left eye: No discharge.   Cardiovascular:      Rate and Rhythm: Normal rate and regular rhythm.      Pulses:           Dorsalis pedis pulses are 2+ on the right side and 2+ on the left side.        Posterior tibial pulses are 2+ on the right side and 2+ on the left side.   Pulmonary:      Effort: Pulmonary effort is normal.      Breath sounds: Normal breath sounds.   Skin:     General: Skin is warm.      Capillary Refill: Capillary refill takes less than 2 seconds.   Neurological:      Sensory: Sensation is intact. No sensory deficit.           MSK:  -Pain on palpation left 4th metatarsal neck  -No gross deformities noted   -Active range of motion lesser digits intact  -Manual muscle testing 5/5 to all muscle compartments of the lower extremity  -Equinus not assessed secondary to pain    Derm:  -No lesions, abrasions, or open wounds noted  -Ecchymosis noted at the left forefoot  -Edema noted at the left forefoot  -No callus formation noted on exam     Vascular:  -DP and PT pulses intact b/l  -Capillary refill time <2 sec b/l  -Digital hair growth: Present  -Skin temp: WNL

## 2025-01-27 ENCOUNTER — APPOINTMENT (OUTPATIENT)
Dept: RADIOLOGY | Facility: CLINIC | Age: 30
End: 2025-01-27
Payer: COMMERCIAL

## 2025-01-27 ENCOUNTER — OFFICE VISIT (OUTPATIENT)
Age: 30
End: 2025-01-27
Payer: COMMERCIAL

## 2025-01-27 VITALS — HEIGHT: 70 IN | BODY MASS INDEX: 41.95 KG/M2 | WEIGHT: 293 LBS

## 2025-01-27 DIAGNOSIS — S92.342D CLOSED DISPLACED FRACTURE OF FOURTH METATARSAL BONE OF LEFT FOOT WITH ROUTINE HEALING, SUBSEQUENT ENCOUNTER: Primary | ICD-10-CM

## 2025-01-27 DIAGNOSIS — S92.342A CLOSED DISPLACED FRACTURE OF FOURTH METATARSAL BONE OF LEFT FOOT, INITIAL ENCOUNTER: ICD-10-CM

## 2025-01-27 PROCEDURE — 73630 X-RAY EXAM OF FOOT: CPT

## 2025-01-27 PROCEDURE — 99213 OFFICE O/P EST LOW 20 MIN: CPT | Performed by: STUDENT IN AN ORGANIZED HEALTH CARE EDUCATION/TRAINING PROGRAM

## 2025-01-27 NOTE — PROGRESS NOTES
"[facility] Podiatric Medicine and Surgery Office Visit    ASSESSMENT     Diagnoses and all orders for this visit:    Closed displaced fracture of fourth metatarsal bone of left foot with routine healing, subsequent encounter  -     XR foot 3+ vw left; Future           Problem List Items Addressed This Visit    None  Visit Diagnoses         Closed displaced fracture of fourth metatarsal bone of left foot with routine healing, subsequent encounter    -  Primary    Relevant Orders    XR foot 3+ vw left (Completed)            PLAN  -Patient was educated regarding their condition.  -Recommend minimal weightbearing to the left foot in a CAM boot.   -Return to clinic in 4 weeks for repeat x-rays    -X-ray from 1/27/2025 of left foot reviewed: Minimally displaced fracture of the fourth metatarsal noted at the neck. I note medial angulation of the distal fragment.  Increased soft-tissue density and volume noted dorsal to the fracture site.      SUBJECTIVE    Chief Complaint:  Left foot pain     Patient ID: Genevieve Mcqueen     12/23/2024: Genevieve presents today for evaluation of her left foot.  She states that she missed stepped the other night and felt immediate pain to her forefoot.  She states that there is swelling as well as some bruising to the area.    1/27/2025: Genevieve is overall doing well today. She states that she does have some pain that comes and goes.         The following portions of the patient's history were reviewed and updated as appropriate: allergies, current medications, past family history, past medical history, past social history, past surgical history and problem list.    Review of Systems   Constitutional: Negative.    HENT: Negative.     Respiratory: Negative.     Cardiovascular: Negative.    Gastrointestinal: Negative.    Musculoskeletal:  Positive for arthralgias.   Skin: Negative.    Neurological: Negative.          OBJECTIVE      Ht 5' 10\" (1.778 m)   Wt (!) 163 kg (360 lb)   BMI 51.65 " kg/m²        Physical Exam  Constitutional:       Appearance: Normal appearance.   HENT:      Head: Normocephalic and atraumatic.   Eyes:      General:         Right eye: No discharge.         Left eye: No discharge.   Cardiovascular:      Rate and Rhythm: Normal rate and regular rhythm.      Pulses:           Dorsalis pedis pulses are 2+ on the right side and 2+ on the left side.        Posterior tibial pulses are 2+ on the right side and 2+ on the left side.   Pulmonary:      Effort: Pulmonary effort is normal.      Breath sounds: Normal breath sounds.   Skin:     General: Skin is warm.      Capillary Refill: Capillary refill takes less than 2 seconds.   Neurological:      Sensory: Sensation is intact. No sensory deficit.           MSK:  -Pain on palpation left 4th metatarsal neck  -No gross deformities noted   -Active range of motion lesser digits intact  -Manual muscle testing 5/5 to all muscle compartments of the lower extremity  -Equinus not assessed secondary to pain    Derm:  -No lesions, abrasions, or open wounds noted  -No edema or ecchymosis noted today  -No callus formation noted on exam     Vascular:  -DP and PT pulses intact b/l  -Capillary refill time <2 sec b/l  -Digital hair growth: Present  -Skin temp: WNL

## 2025-03-03 ENCOUNTER — TELEPHONE (OUTPATIENT)
Age: 30
End: 2025-03-03

## 2025-03-03 NOTE — TELEPHONE ENCOUNTER
LM to reschedule her appt. Tomorrow with Dr. Forte, He has other appt. Available tomorrow just later.

## 2025-03-05 ENCOUNTER — APPOINTMENT (OUTPATIENT)
Dept: RADIOLOGY | Facility: CLINIC | Age: 30
End: 2025-03-05
Payer: COMMERCIAL

## 2025-03-05 ENCOUNTER — OFFICE VISIT (OUTPATIENT)
Age: 30
End: 2025-03-05
Payer: COMMERCIAL

## 2025-03-05 VITALS — HEIGHT: 70 IN | WEIGHT: 293 LBS | BODY MASS INDEX: 41.95 KG/M2

## 2025-03-05 DIAGNOSIS — M72.2 PLANTAR FASCIITIS, LEFT: ICD-10-CM

## 2025-03-05 DIAGNOSIS — S92.342D CLOSED DISPLACED FRACTURE OF FOURTH METATARSAL BONE OF LEFT FOOT WITH ROUTINE HEALING, SUBSEQUENT ENCOUNTER: ICD-10-CM

## 2025-03-05 DIAGNOSIS — S92.342D CLOSED DISPLACED FRACTURE OF FOURTH METATARSAL BONE OF LEFT FOOT WITH ROUTINE HEALING, SUBSEQUENT ENCOUNTER: Primary | ICD-10-CM

## 2025-03-05 PROCEDURE — 99214 OFFICE O/P EST MOD 30 MIN: CPT | Performed by: STUDENT IN AN ORGANIZED HEALTH CARE EDUCATION/TRAINING PROGRAM

## 2025-03-05 PROCEDURE — 73630 X-RAY EXAM OF FOOT: CPT

## 2025-03-05 RX ORDER — MELOXICAM 15 MG/1
15 TABLET ORAL DAILY
Qty: 30 TABLET | Refills: 0 | Status: SHIPPED | OUTPATIENT
Start: 2025-03-05

## 2025-03-05 NOTE — PROGRESS NOTES
[facility] Podiatric Medicine and Surgery Office Visit    ASSESSMENT     Diagnoses and all orders for this visit:    Closed displaced fracture of fourth metatarsal bone of left foot with routine healing, subsequent encounter  -     XR foot 3+ vw left; Future    Plantar fasciitis, left  -     meloxicam (Mobic) 15 mg tablet; Take 1 tablet (15 mg total) by mouth daily             Problem List Items Addressed This Visit    None  Visit Diagnoses         Closed displaced fracture of fourth metatarsal bone of left foot with routine healing, subsequent encounter    -  Primary    Relevant Orders    XR foot 3+ vw left      Plantar fasciitis, left        Relevant Medications    meloxicam (Mobic) 15 mg tablet          PLAN  -Patient was educated regarding their condition.  -Rx night splint  -Educated patient on plantar fasciitis stretching program to be performed daily  -Recommend purchase of supportive shoes with wide toe box. Recommend purchase of OTC orthosis (superfeet, powersteps, or tread labs).  -Rx meloxicam  -Patient should refrain from excessive physical activity for the next 2 weeks, after that she will be cleared for activity as tolerated  -Corticosteroid injection not given today  -Patient will RTC in 6-weeks    -X-ray from 3/5/2025 of left foot reviewed: Minimally displaced fracture of the fourth metatarsal noted at the neck, now appears fully healed. I note medial angulation of the distal fragment, however this is minimal    SUBJECTIVE    Chief Complaint:  Left foot pain     Patient ID: Genevieve Mcqueen     12/23/2024: Genevieve presents today for evaluation of her left foot.  She states that she missed stepped the other night and felt immediate pain to her forefoot.  She states that there is swelling as well as some bruising to the area.    1/27/2025: Genevieve is overall doing well today. She states that she does have some pain that comes and goes.     3/5/2025: Genevieve is doing well today. She states that  "her left foot has been feeling okay. She mentions now that she has started to have heel pain in the same foot. It hurts her the most while walking.         The following portions of the patient's history were reviewed and updated as appropriate: allergies, current medications, past family history, past medical history, past social history, past surgical history and problem list.    Review of Systems   Constitutional: Negative.    HENT: Negative.     Respiratory: Negative.     Cardiovascular: Negative.    Gastrointestinal: Negative.    Musculoskeletal:  Positive for arthralgias.   Skin: Negative.    Neurological: Negative.          OBJECTIVE      Ht 5' 10\" (1.778 m)   Wt (!) 163 kg (360 lb)   BMI 51.65 kg/m²        Physical Exam  Constitutional:       Appearance: Normal appearance.   HENT:      Head: Normocephalic and atraumatic.   Eyes:      General:         Right eye: No discharge.         Left eye: No discharge.   Cardiovascular:      Rate and Rhythm: Normal rate and regular rhythm.      Pulses:           Dorsalis pedis pulses are 2+ on the right side and 2+ on the left side.        Posterior tibial pulses are 2+ on the right side and 2+ on the left side.   Pulmonary:      Effort: Pulmonary effort is normal.      Breath sounds: Normal breath sounds.   Skin:     General: Skin is warm.      Capillary Refill: Capillary refill takes less than 2 seconds.   Neurological:      Sensory: Sensation is intact. No sensory deficit.           MSK:  -Pain on palpation left 4th metatarsal neck, this is much more minimal compared to previous visits.  -Pain on palpation to the left plantar foot at the mid arch level  -No gross deformities noted   -Active range of motion lesser digits intact  -Manual muscle testing 5/5 to all muscle compartments of the lower extremity  -Equinus not assessed secondary to pain    Derm:  -No lesions, abrasions, or open wounds noted  -No edema or ecchymosis noted today  -No callus formation noted on " exam     Vascular:  -DP and PT pulses intact b/l  -Capillary refill time <2 sec b/l  -Digital hair growth: Present  -Skin temp: WNL

## 2025-03-11 ENCOUNTER — APPOINTMENT (OUTPATIENT)
Dept: LAB | Facility: CLINIC | Age: 30
End: 2025-03-11
Payer: COMMERCIAL

## 2025-03-11 ENCOUNTER — TRANSCRIBE ORDERS (OUTPATIENT)
Dept: LAB | Facility: CLINIC | Age: 30
End: 2025-03-11

## 2025-03-11 ENCOUNTER — OFFICE VISIT (OUTPATIENT)
Dept: FAMILY MEDICINE CLINIC | Facility: CLINIC | Age: 30
End: 2025-03-11
Payer: COMMERCIAL

## 2025-03-11 VITALS
SYSTOLIC BLOOD PRESSURE: 144 MMHG | HEIGHT: 68 IN | TEMPERATURE: 98.5 F | DIASTOLIC BLOOD PRESSURE: 90 MMHG | HEART RATE: 80 BPM | RESPIRATION RATE: 16 BRPM | BODY MASS INDEX: 44.41 KG/M2 | WEIGHT: 293 LBS

## 2025-03-11 DIAGNOSIS — Z13.6 SCREENING FOR CARDIOVASCULAR CONDITION: ICD-10-CM

## 2025-03-11 DIAGNOSIS — Z11.59 NEED FOR HEPATITIS C SCREENING TEST: ICD-10-CM

## 2025-03-11 DIAGNOSIS — Z13.21 ENCOUNTER FOR VITAMIN DEFICIENCY SCREENING: ICD-10-CM

## 2025-03-11 DIAGNOSIS — K63.5 POLYP OF DESCENDING COLON, UNSPECIFIED TYPE: ICD-10-CM

## 2025-03-11 DIAGNOSIS — E66.09 OBESITY DUE TO EXCESS CALORIES WITHOUT SERIOUS COMORBIDITY, UNSPECIFIED CLASS: ICD-10-CM

## 2025-03-11 DIAGNOSIS — Z00.00 ROUTINE ADULT HEALTH MAINTENANCE: Primary | ICD-10-CM

## 2025-03-11 DIAGNOSIS — G93.2 PSEUDOTUMOR: ICD-10-CM

## 2025-03-11 DIAGNOSIS — Z13.29 SCREENING FOR THYROID DISORDER: ICD-10-CM

## 2025-03-11 DIAGNOSIS — E66.01 CLASS 3 SEVERE OBESITY WITHOUT SERIOUS COMORBIDITY WITH BODY MASS INDEX (BMI) OF 50.0 TO 59.9 IN ADULT, UNSPECIFIED OBESITY TYPE (HCC): Primary | ICD-10-CM

## 2025-03-11 DIAGNOSIS — E66.813 CLASS 3 SEVERE OBESITY WITHOUT SERIOUS COMORBIDITY WITH BODY MASS INDEX (BMI) OF 50.0 TO 59.9 IN ADULT, UNSPECIFIED OBESITY TYPE (HCC): Primary | ICD-10-CM

## 2025-03-11 PROBLEM — S86.012D ACHILLES TENDON RUPTURE, LEFT, SUBSEQUENT ENCOUNTER: Status: ACTIVE | Noted: 2024-02-09

## 2025-03-11 PROBLEM — Z01.811 PREOPERATIVE RESPIRATORY EXAMINATION: Status: RESOLVED | Noted: 2024-02-06 | Resolved: 2025-03-11

## 2025-03-11 PROBLEM — M79.672 LEFT FOOT PAIN: Status: RESOLVED | Noted: 2024-12-23 | Resolved: 2025-03-11

## 2025-03-11 PROCEDURE — 36415 COLL VENOUS BLD VENIPUNCTURE: CPT

## 2025-03-11 PROCEDURE — 80053 COMPREHEN METABOLIC PANEL: CPT

## 2025-03-11 PROCEDURE — 84402 ASSAY OF FREE TESTOSTERONE: CPT

## 2025-03-11 PROCEDURE — 85025 COMPLETE CBC W/AUTO DIFF WBC: CPT

## 2025-03-11 PROCEDURE — 84443 ASSAY THYROID STIM HORMONE: CPT

## 2025-03-11 PROCEDURE — 84439 ASSAY OF FREE THYROXINE: CPT

## 2025-03-11 PROCEDURE — 82306 VITAMIN D 25 HYDROXY: CPT

## 2025-03-11 PROCEDURE — 80061 LIPID PANEL: CPT

## 2025-03-11 PROCEDURE — 83036 HEMOGLOBIN GLYCOSYLATED A1C: CPT

## 2025-03-11 PROCEDURE — 99385 PREV VISIT NEW AGE 18-39: CPT | Performed by: NURSE PRACTITIONER

## 2025-03-11 PROCEDURE — 84403 ASSAY OF TOTAL TESTOSTERONE: CPT

## 2025-03-11 PROCEDURE — 86803 HEPATITIS C AB TEST: CPT

## 2025-03-11 PROCEDURE — 86618 LYME DISEASE ANTIBODY: CPT

## 2025-03-11 NOTE — PROGRESS NOTES
Adult Annual Physical  Name: Genevieve Mcqueen      : 1995      MRN: [id number]  Encounter Provider: PATRICE Oviedo  Encounter Date: 3/11/2025   Encounter department: [facility]    Assessment & Plan  Routine adult health maintenance    Orders:  •  Ambulatory Referral to Obstetrics / Gynecology; Future    Encounter for vitamin deficiency screening    Orders:  •  Vitamin D 25 hydroxy; Future    Screening for cardiovascular condition    Orders:  •  CBC and differential; Future  •  Comprehensive metabolic panel; Future  •  Lipid Panel with Direct LDL reflex; Future    Screening for thyroid disorder    Orders:  •  TSH, 3rd generation; Future  •  T4, free; Future    Need for hepatitis C screening test    Orders:  •  Hepatitis C antibody; Future    Obesity due to excess calories without serious comorbidity, unspecified class  Reviewed medication management, she is interested in a GLP-1 agonist.  Will plan to start Zepbound pending labs.  She will also continue to work on formal diet including high-protein/low-carb, minimize snacking.  She will also start exercise and that she is cleared after her foot fracture.  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 seeing neurology her eye doctor  Orders:  •  Lyme Total AB W Reflex to IGM/IGG; Future    Preventive Screenings:  - Diabetes Screening: orders placed  - Cholesterol Screening: orders placed   - Hepatitis C screening: orders placed   - HIV screening: screening not indicated   - Cervical cancer screening: risks/benefits discussed   - Colon cancer screening: orders placed   - Lung cancer screening: screening not indicated   - Prostate cancer screening: screening not indicated      GYN referral provided      Immunizations:  - Immunizations due: Influenza, Tdap and pt believes these are up to date, will request records    Counseling/Anticipatory Guidance:    - Diet: discussed recommendations for a healthy/well-balanced diet.   - Exercise: the importance of regular exercise/physical activity was discussed. Recommend exercise 3-5 times per week for at least 30 minutes.       Depression Screening and Follow-up Plan: Patient was screened for depression during today's encounter. They screened negative with a PHQ-2 score of 1.      Tobacco Cessation Counseling: Tobacco cessation counseling was provided.         History of Present Illness     Adult Annual Physical:  Patient presents for annual physical. New pt here to establish care.    Would like to discuss weight loss  options  She ruptured her left Valerie's tendon and then subsequently broke the same foot last year.   Weight has fluctuated over the past several years.  Gained a lot with Covid.  Weight has been stable over the past year.    Normal around 320 lbs. Has had success with Weight Watchers in the past  She has finished with PT   Hx IBS- underwent colonoscopy and endoscopy, due for repeat colonoscopy  Hx pseudotumor- was on medication for the headaches, managed.     Diet and Physical Activity:  - Diet/Nutrition:. gluten free x3 years  - Exercise: no formal exercise.    Depression Screening:  - PHQ-2 Score: 1    General Health:  - Sleep: sleeps poorly. issues falling asleep and staying asleep.  - Hearing: normal hearing bilateral ears.  - Vision: most recent eye exam > 1 year ago and wears contacts.  - Dental: regular dental visits and brushes teeth twice daily.    /GYN Health:  - Follows with GYN: no.   - Menopause: premenopausal.   - Contraception:. irregular and heavy      Review of Systems   Constitutional: Negative.    Respiratory: Negative.     Cardiovascular: Negative.    Gastrointestinal: Negative.    Neurological: Negative.   "  Psychiatric/Behavioral: Negative.       Current Outpatient Medications on File Prior to Visit   Medication Sig Dispense Refill   • meloxicam (Mobic) 15 mg tablet Take 1 tablet (15 mg total) by mouth daily 30 tablet 0   • [DISCONTINUED] amoxicillin-clavulanate (AUGMENTIN) 875-125 mg per tablet Take 1 tablet by mouth every 12 (twelve) hours (Patient not taking: Reported on 2/6/2024)     • [DISCONTINUED] aspirin 81 mg chewable tablet Chew 81 mg daily (Patient not taking: Reported on 3/11/2025)     • [DISCONTINUED] benzonatate (TESSALON) 200 MG capsule Take 1 capsule (200 mg total) by mouth 3 (three) times a day as needed for cough (Patient not taking: Reported on 2/6/2024) 20 capsule 0   • [DISCONTINUED] ergocalciferol (VITAMIN D2) 50,000 units Take 50,000 Units by mouth once a week (Patient not taking: Reported on 12/23/2024)     • [DISCONTINUED] fluconazole (DIFLUCAN) 150 mg tablet Take 150 mg by mouth daily (Patient not taking: Reported on 2/6/2024)     • [DISCONTINUED] rivaroxaban (Xarelto) 10 mg tablet Take 1 tablet (10 mg total) by mouth daily (Patient not taking: Reported on 3/8/2024) 30 tablet 0     No current facility-administered medications on file prior to visit.      Social History     Tobacco Use   • Smoking status: Some Days     Types: Cigarettes     Passive exposure: Current   • Smokeless tobacco: Never   • Tobacco comments:     1 cigaret monthly   Vaping Use   • Vaping status: Some Days   • Substances: Nicotine, Flavoring   Substance and Sexual Activity   • Alcohol use: Yes     Comment: social   • Drug use: Yes     Types: Marijuana   • Sexual activity: Yes       Objective   /90   Pulse 80   Temp 98.5 °F (36.9 °C)   Resp 16   Ht 5' 8.25\" (1.734 m)   Wt (!) 172 kg (379 lb)   LMP 02/24/2025 (Approximate)   BMI 57.21 kg/m²     Physical Exam  Vitals and nursing note reviewed.   Constitutional:       Appearance: Normal appearance. She is well-developed. She is obese.   HENT:      Head: " Normocephalic and atraumatic.      Right Ear: Tympanic membrane and external ear normal.      Left Ear: Tympanic membrane and external ear normal.      Nose: Nose normal.      Mouth/Throat:      Pharynx: Oropharynx is clear.   Eyes:      Extraocular Movements: Extraocular movements intact.      Conjunctiva/sclera: Conjunctivae normal.      Pupils: Pupils are equal, round, and reactive to light.   Neck:      Thyroid: No thyromegaly.      Vascular: No carotid bruit.   Cardiovascular:      Rate and Rhythm: Normal rate and regular rhythm.      Pulses: Normal pulses.      Heart sounds: Normal heart sounds. No murmur heard.  Pulmonary:      Effort: Pulmonary effort is normal.      Breath sounds: Normal breath sounds.   Abdominal:      General: Bowel sounds are normal. There is no distension.      Palpations: Abdomen is soft.      Tenderness: There is no abdominal tenderness.   Musculoskeletal:         General: No deformity. Normal range of motion.      Cervical back: Normal range of motion and neck supple.   Lymphadenopathy:      Cervical: No cervical adenopathy.   Skin:     General: Skin is warm and dry.      Capillary Refill: Capillary refill takes less than 2 seconds.   Neurological:      Mental Status: She is alert.      Sensory: No sensory deficit.      Motor: No abnormal muscle tone.      Coordination: Coordination normal.      Deep Tendon Reflexes: Reflexes normal.   Psychiatric:         Mood and Affect: Mood normal.         Behavior: Behavior normal.

## 2025-03-11 NOTE — ASSESSMENT & PLAN NOTE
Reviewed medication management, she is interested in a GLP-1 agonist.  Will plan to start Zepbound pending labs.  She will also continue to work on formal diet including high-protein/low-carb, minimize snacking.  She will also start exercise and that she is cleared after her foot fracture.  Will have her return to the office in 3 months  Orders:  •  Hemoglobin A1C; Future  •  TSH, 3rd generation; Future  •  T4, free; Future  •  Testosterone, free, total; Future

## 2025-03-12 LAB
25(OH)D3 SERPL-MCNC: 15.9 NG/ML (ref 30–100)
ALBUMIN SERPL BCG-MCNC: 4.1 G/DL (ref 3.5–5)
ALP SERPL-CCNC: 71 U/L (ref 34–104)
ALT SERPL W P-5'-P-CCNC: 31 U/L (ref 7–52)
ANION GAP SERPL CALCULATED.3IONS-SCNC: 7 MMOL/L (ref 4–13)
AST SERPL W P-5'-P-CCNC: 15 U/L (ref 13–39)
B BURGDOR IGG+IGM SER QL IA: NEGATIVE
BASOPHILS # BLD AUTO: 0.03 THOUSANDS/ÂΜL (ref 0–0.1)
BASOPHILS NFR BLD AUTO: 0 % (ref 0–1)
BILIRUB SERPL-MCNC: 0.43 MG/DL (ref 0.2–1)
BUN SERPL-MCNC: 14 MG/DL (ref 5–25)
CALCIUM SERPL-MCNC: 8.9 MG/DL (ref 8.4–10.2)
CHLORIDE SERPL-SCNC: 103 MMOL/L (ref 96–108)
CHOLEST SERPL-MCNC: 179 MG/DL (ref ?–200)
CO2 SERPL-SCNC: 25 MMOL/L (ref 21–32)
CREAT SERPL-MCNC: 0.61 MG/DL (ref 0.6–1.3)
EOSINOPHIL # BLD AUTO: 0.23 THOUSAND/ÂΜL (ref 0–0.61)
EOSINOPHIL NFR BLD AUTO: 3 % (ref 0–6)
ERYTHROCYTE [DISTWIDTH] IN BLOOD BY AUTOMATED COUNT: 14.8 % (ref 11.6–15.1)
EST. AVERAGE GLUCOSE BLD GHB EST-MCNC: 117 MG/DL
GFR SERPL CREATININE-BSD FRML MDRD: 123 ML/MIN/1.73SQ M
GLUCOSE P FAST SERPL-MCNC: 87 MG/DL (ref 65–99)
HBA1C MFR BLD: 5.7 %
HCT VFR BLD AUTO: 41.6 % (ref 34.8–46.1)
HCV AB SER QL: NORMAL
HDLC SERPL-MCNC: 44 MG/DL
HGB BLD-MCNC: 13.3 G/DL (ref 11.5–15.4)
IMM GRANULOCYTES # BLD AUTO: 0.01 THOUSAND/UL (ref 0–0.2)
IMM GRANULOCYTES NFR BLD AUTO: 0 % (ref 0–2)
LDLC SERPL CALC-MCNC: 106 MG/DL (ref 0–100)
LYMPHOCYTES # BLD AUTO: 2.02 THOUSANDS/ÂΜL (ref 0.6–4.47)
LYMPHOCYTES NFR BLD AUTO: 29 % (ref 14–44)
MCH RBC QN AUTO: 25.4 PG (ref 26.8–34.3)
MCHC RBC AUTO-ENTMCNC: 32 G/DL (ref 31.4–37.4)
MCV RBC AUTO: 80 FL (ref 82–98)
MONOCYTES # BLD AUTO: 0.54 THOUSAND/ÂΜL (ref 0.17–1.22)
MONOCYTES NFR BLD AUTO: 8 % (ref 4–12)
NEUTROPHILS # BLD AUTO: 4.26 THOUSANDS/ÂΜL (ref 1.85–7.62)
NEUTS SEG NFR BLD AUTO: 60 % (ref 43–75)
NRBC BLD AUTO-RTO: 0 /100 WBCS
PLATELET # BLD AUTO: 395 THOUSANDS/UL (ref 149–390)
PMV BLD AUTO: 10 FL (ref 8.9–12.7)
POTASSIUM SERPL-SCNC: 4.4 MMOL/L (ref 3.5–5.3)
PROT SERPL-MCNC: 7.4 G/DL (ref 6.4–8.4)
RBC # BLD AUTO: 5.23 MILLION/UL (ref 3.81–5.12)
SODIUM SERPL-SCNC: 135 MMOL/L (ref 135–147)
T4 FREE SERPL-MCNC: 0.89 NG/DL (ref 0.61–1.12)
TRIGL SERPL-MCNC: 143 MG/DL (ref ?–150)
TSH SERPL DL<=0.05 MIU/L-ACNC: 2.29 UIU/ML (ref 0.45–4.5)
WBC # BLD AUTO: 7.09 THOUSAND/UL (ref 4.31–10.16)

## 2025-03-13 LAB
TESTOST FREE SERPL-MCNC: 9.5 PG/ML (ref 0–4.2)
TESTOST SERPL-MCNC: 76 NG/DL (ref 13–71)

## 2025-03-14 ENCOUNTER — RESULTS FOLLOW-UP (OUTPATIENT)
Dept: FAMILY MEDICINE CLINIC | Facility: CLINIC | Age: 30
End: 2025-03-14

## 2025-03-14 DIAGNOSIS — E55.9 VITAMIN D DEFICIENCY: Primary | ICD-10-CM

## 2025-03-14 DIAGNOSIS — E66.813 CLASS 3 SEVERE OBESITY WITHOUT SERIOUS COMORBIDITY WITH BODY MASS INDEX (BMI) OF 50.0 TO 59.9 IN ADULT, UNSPECIFIED OBESITY TYPE (HCC): ICD-10-CM

## 2025-03-14 DIAGNOSIS — E66.01 CLASS 3 SEVERE OBESITY WITHOUT SERIOUS COMORBIDITY WITH BODY MASS INDEX (BMI) OF 50.0 TO 59.9 IN ADULT, UNSPECIFIED OBESITY TYPE (HCC): ICD-10-CM

## 2025-03-14 RX ORDER — ERGOCALCIFEROL 1.25 MG/1
50000 CAPSULE, LIQUID FILLED ORAL WEEKLY
Qty: 8 CAPSULE | Refills: 0 | Status: SHIPPED | OUTPATIENT
Start: 2025-03-14

## 2025-03-14 RX ORDER — TIRZEPATIDE 2.5 MG/.5ML
2.5 INJECTION, SOLUTION SUBCUTANEOUS WEEKLY
Qty: 2 ML | Refills: 0 | Status: SHIPPED | OUTPATIENT
Start: 2025-03-14 | End: 2025-04-11

## 2025-03-17 ENCOUNTER — OFFICE VISIT (OUTPATIENT)
Dept: GASTROENTEROLOGY | Facility: CLINIC | Age: 30
End: 2025-03-17
Payer: COMMERCIAL

## 2025-03-17 VITALS
DIASTOLIC BLOOD PRESSURE: 82 MMHG | BODY MASS INDEX: 41.95 KG/M2 | HEIGHT: 70 IN | HEART RATE: 96 BPM | SYSTOLIC BLOOD PRESSURE: 110 MMHG | WEIGHT: 293 LBS

## 2025-03-17 DIAGNOSIS — R10.9 RIGHT SIDED ABDOMINAL PAIN: ICD-10-CM

## 2025-03-17 DIAGNOSIS — K63.5 HYPERPLASTIC COLONIC POLYP, UNSPECIFIED PART OF COLON: ICD-10-CM

## 2025-03-17 DIAGNOSIS — R13.19 ESOPHAGEAL DYSPHAGIA: Primary | ICD-10-CM

## 2025-03-17 DIAGNOSIS — R19.7 DIARRHEA, UNSPECIFIED TYPE: ICD-10-CM

## 2025-03-17 PROCEDURE — 99244 OFF/OP CNSLTJ NEW/EST MOD 40: CPT | Performed by: PHYSICIAN ASSISTANT

## 2025-03-17 RX ORDER — FAMOTIDINE 40 MG/1
40 TABLET, FILM COATED ORAL
Qty: 30 TABLET | Refills: 3 | Status: SHIPPED | OUTPATIENT
Start: 2025-03-17

## 2025-03-17 RX ORDER — SODIUM CHLORIDE, SODIUM LACTATE, POTASSIUM CHLORIDE, CALCIUM CHLORIDE 600; 310; 30; 20 MG/100ML; MG/100ML; MG/100ML; MG/100ML
125 INJECTION, SOLUTION INTRAVENOUS CONTINUOUS
Status: CANCELLED | OUTPATIENT
Start: 2025-03-17

## 2025-03-17 NOTE — PATIENT INSTRUCTIONS
Scheduled date of EGD(as of today):03.20.25  Physician performing EGD:DR HAYNES  Location of EGD:[facility]  Instructions reviewed with patient by:QIANA  Clearances: N/A

## 2025-03-17 NOTE — PROGRESS NOTES
Name: Genevieve Mcqueen      : 1995      MRN: [id number]  Encounter Provider: Jaye Bowles PA-C  Encounter Date: 3/17/2025   Encounter department: [facility] [place]  :  Assessment & Plan  Esophageal dysphagia  Patient reports difficulty swallowing both solids and liquids over the last few months.  Thinks she had an EGD a few years ago in [place] but I do not have this report to review at the time of the visit today.    Discussed with the patient potential differential diagnosis for the symptoms.  Will start patient on famotidine 40 mg once daily at bedtime and monitor symptoms  Encouraged the patient to eat smaller, more frequent meals and to avoid excess fatty, greasy, spicy foods and limit excess caffeine, chocolate, alcohol, citrus foods, tomato sauces.  She expressed understanding to this.  Will plan for EGD in the hospital setting with most distal and proximal esophageal biopsies to rule out EOE, also biopsy of the stomach to rule out H. pylori, duodenum to rule out celiac disease  Encouraged the patient take small bites of food, carefully chew food with sips of water  Also advised the patient to limit Mobic/NSAIDs if able as these may be making her symptoms worse  All questions answered.  Patient appreciative of care.  Orders:    famotidine (PEPCID) 40 MG tablet; Take 1 tablet (40 mg total) by mouth daily at bedtime    EGD; Future    Right sided abdominal pain  Describes right-sided abdominal pain for years with associated diarrhea.  Pain can sometimes radiate to the back.  She has had workup including complete abdominal ultrasound and CT imaging in the past which has been unremarkable.  Colonoscopy pathology also grossly unremarkable.  Discussed with the patient that she may have underlying IBS causing pain.  Pain may also be gallbladder related.  I do recommend patient undergo stool testing given ongoing diarrhea including Giardia, ova and parasite, fecal  calprotectin.  To consider repeat colonoscopy if fecal calprotectin is elevated.  Otherwise, I recommend patient obtain updated right upper quadrant ultrasound to evaluate for gallstones.  If her pain continues she may benefit from HIDA scan to evaluate for biliary dyskinesia.  All questions answered.  Orders:    famotidine (PEPCID) 40 MG tablet; Take 1 tablet (40 mg total) by mouth daily at bedtime    EGD; Future    US right upper quadrant; Future    Diarrhea, unspecified type  As above  Orders:    Giardia antigen; Future    Ova and parasite examination; Future    Calprotectin,Fecal; Future    EGD; Future    Hyperplastic colonic polyp, unspecified part of colon  Per pathology colon polyp was hyperplastic which is not precancerous.  No need for repeat colonoscopy at this time.  Patient will try to obtain prior colonoscopy report for chart.  Orders:    Ambulatory Referral to Gastroenterology          The risk/benefits/alternatives of the procedure/s were discussed with the patient.  Risks included, but not limited to, infection, bleeding, perforation, injury to organs in the abdomen, missed lesion and incomplete procedure were discussed.  Patient expressed understanding and agreeable to proceed with procedure/s.    Patient was instructed to call the office with any questions, concerns, new/ worsening/ persisting GI symptoms. Advised patient go to the ER with any severe or worsening abdominal pain, fevers/ chills, intractable N/V, chest pain, SOB, dizziness, lightheadedness, feeling something stuck in esophagus that will not go down. Patient expressed understanding and is in agreement with treatment plan.     Will plan to follow up after diagnostic tests in a few months       History of Present Illness   HPI  Genevieve Mcqueen is a 29 y.o. female with a past medical history of obesity, colon polyps, IBS who presents to the office today to establish care, referred by PCP.    History taken by KATE Denny and  myself    Patient complains of right sided abdominal pain x 4 years   The abdominal pain is almost daily.  She describes the abdominal pain as sharp. The pain comes and goes.   Nothing makes it worse. Heating pad and Advil can improve the pain.   The pain can radiate to her back at times.   This pain has been about the same since onset 4 years ago/ is largely unchanged.   There is associated looser stools with abdominal pain.   She has 3-4 Bms/ day. Can have up to 6 stools/ day.   No blood in stool.   Patient also reports difficulty swallowing x few months. She feels as though food and drink stuck in mid chest after a swallow.  Symptoms with both solids and liquids.   She does have occasional pain with swallowing  She also complains of intermittent nausea a few x/ week without vomiting.   She denies heartburn.   Patient recently saw PCP who ordered GLP-1 medication for weight loss but she did not yet start this.     Tobacco use- Rare in social settings   Alcohol use- Rare ~ 2 x/ month   NSAID use- She is on Mobic for joint pains, taking daily     Patient denies known first-degree relative with colon cancer, inflammatory bowel disease, celiac disease     She has been gluten free for years     No prior abdominal surgeries     Patient states she had EGD and colonoscopy in [facility] ~ 2 years ago.  Do not have these reports to review at the time of visit today but I am able to see the surgical pathology in the chart from colonoscopy done in May 2022.  She had right and left colon biopsies which were normal and a terminal ileum biopsy which was normal.  Sigmoid colon polyp was a hyperplastic polyp.    Per care everywhere she had negative/normal celiac disease panel in June 2022    Patient did undergo a CTA abdomen and pelvis without contrast for right sided abdominal pain in April 2023, this report was reviewed, this showed no acute abdominal pelvic pathology.  She did have an abdominal ultrasound in October 2022 for  "right-sided abdominal pain, this was reviewed, which showed evidence of hepatic steatosis, otherwise was unremarkable without gallstones.    Review of Systems   Constitutional:  Negative for chills and fever.   HENT:  Positive for trouble swallowing. Negative for ear pain and sore throat.    Eyes:  Negative for pain and visual disturbance.   Respiratory:  Negative for cough and shortness of breath.    Cardiovascular:  Negative for chest pain and palpitations.   Gastrointestinal:  Positive for abdominal pain and nausea. Negative for vomiting.   Genitourinary:  Negative for dysuria and hematuria.   Musculoskeletal:  Negative for arthralgias and back pain.   Skin:  Negative for color change and rash.   Neurological:  Negative for seizures and syncope.   All other systems reviewed and are negative.      Objective   /82   Pulse 96   Ht 5' 10\" (1.778 m)   Wt (!) 173 kg (382 lb)   LMP 02/24/2025 (Approximate)   BMI 54.81 kg/m²      Physical Exam  Vitals reviewed.   Constitutional:       General: She is not in acute distress.     Appearance: She is obese. She is not ill-appearing, toxic-appearing or diaphoretic.   HENT:      Head: Normocephalic and atraumatic.   Eyes:      Extraocular Movements: Extraocular movements intact.      Conjunctiva/sclera: Conjunctivae normal.   Cardiovascular:      Rate and Rhythm: Normal rate and regular rhythm.   Pulmonary:      Effort: Pulmonary effort is normal. No respiratory distress.      Breath sounds: Normal breath sounds.   Abdominal:      General: Bowel sounds are normal.      Palpations: Abdomen is soft.      Tenderness: There is abdominal tenderness.   Musculoskeletal:         General: No swelling or tenderness.      Cervical back: Normal range of motion and neck supple.   Skin:     General: Skin is warm and dry.      Coloration: Skin is not jaundiced.   Neurological:      General: No focal deficit present.      Mental Status: She is alert and oriented to person, place, " and time. Mental status is at baseline.   Psychiatric:         Mood and Affect: Mood normal.         Behavior: Behavior normal.         Thought Content: Thought content normal.

## 2025-03-17 NOTE — ASSESSMENT & PLAN NOTE
Per pathology colon polyp was hyperplastic which is not precancerous.  No need for repeat colonoscopy at this time.  Patient will try to obtain prior colonoscopy report for chart.  Orders:    Ambulatory Referral to Gastroenterology

## 2025-03-20 ENCOUNTER — ANESTHESIA EVENT (OUTPATIENT)
Dept: GASTROENTEROLOGY | Facility: HOSPITAL | Age: 30
End: 2025-03-20
Payer: COMMERCIAL

## 2025-03-20 ENCOUNTER — HOSPITAL ENCOUNTER (OUTPATIENT)
Dept: GASTROENTEROLOGY | Facility: HOSPITAL | Age: 30
Setting detail: OUTPATIENT SURGERY
Discharge: HOME | End: 2025-03-20
Attending: INTERNAL MEDICINE
Payer: COMMERCIAL

## 2025-03-20 ENCOUNTER — ANESTHESIA (OUTPATIENT)
Dept: GASTROENTEROLOGY | Facility: HOSPITAL | Age: 30
End: 2025-03-20
Payer: COMMERCIAL

## 2025-03-20 VITALS
RESPIRATION RATE: 18 BRPM | TEMPERATURE: 97.1 F | HEART RATE: 99 BPM | SYSTOLIC BLOOD PRESSURE: 118 MMHG | WEIGHT: 293 LBS | DIASTOLIC BLOOD PRESSURE: 64 MMHG | HEIGHT: 70 IN | OXYGEN SATURATION: 97 % | BODY MASS INDEX: 41.95 KG/M2

## 2025-03-20 DIAGNOSIS — K21.00 GASTROESOPHAGEAL REFLUX DISEASE WITH ESOPHAGITIS WITHOUT HEMORRHAGE: Primary | ICD-10-CM

## 2025-03-20 DIAGNOSIS — R19.7 DIARRHEA, UNSPECIFIED TYPE: ICD-10-CM

## 2025-03-20 DIAGNOSIS — R13.19 ESOPHAGEAL DYSPHAGIA: ICD-10-CM

## 2025-03-20 DIAGNOSIS — R10.9 RIGHT SIDED ABDOMINAL PAIN: ICD-10-CM

## 2025-03-20 PROBLEM — K21.9 GASTROESOPHAGEAL REFLUX DISEASE: Status: ACTIVE | Noted: 2025-03-20

## 2025-03-20 LAB
EXT PREGNANCY TEST URINE: NEGATIVE
EXT. CONTROL: NORMAL

## 2025-03-20 PROCEDURE — 81025 URINE PREGNANCY TEST: CPT | Performed by: INTERNAL MEDICINE

## 2025-03-20 PROCEDURE — 43239 EGD BIOPSY SINGLE/MULTIPLE: CPT | Performed by: INTERNAL MEDICINE

## 2025-03-20 PROCEDURE — 88305 TISSUE EXAM BY PATHOLOGIST: CPT | Performed by: PATHOLOGY

## 2025-03-20 RX ORDER — PROPOFOL 10 MG/ML
INJECTION, EMULSION INTRAVENOUS AS NEEDED
Status: DISCONTINUED | OUTPATIENT
Start: 2025-03-20 | End: 2025-03-20

## 2025-03-20 RX ORDER — SODIUM CHLORIDE, SODIUM LACTATE, POTASSIUM CHLORIDE, CALCIUM CHLORIDE 600; 310; 30; 20 MG/100ML; MG/100ML; MG/100ML; MG/100ML
125 INJECTION, SOLUTION INTRAVENOUS CONTINUOUS
Status: DISCONTINUED | OUTPATIENT
Start: 2025-03-20 | End: 2025-03-24 | Stop reason: HOSPADM

## 2025-03-20 RX ORDER — OMEPRAZOLE 20 MG/1
20 CAPSULE, DELAYED RELEASE ORAL DAILY
Qty: 30 CAPSULE | Refills: 5 | Status: SHIPPED | OUTPATIENT
Start: 2025-03-20 | End: 2025-03-27

## 2025-03-20 RX ORDER — SODIUM CHLORIDE 9 MG/ML
INJECTION, SOLUTION INTRAVENOUS CONTINUOUS PRN
Status: DISCONTINUED | OUTPATIENT
Start: 2025-03-20 | End: 2025-03-20

## 2025-03-20 RX ORDER — FENTANYL CITRATE 50 UG/ML
INJECTION, SOLUTION INTRAMUSCULAR; INTRAVENOUS AS NEEDED
Status: DISCONTINUED | OUTPATIENT
Start: 2025-03-20 | End: 2025-03-20

## 2025-03-20 RX ADMIN — PROPOFOL 50 MG: 10 INJECTION, EMULSION INTRAVENOUS at 11:38

## 2025-03-20 RX ADMIN — PROPOFOL 50 MG: 10 INJECTION, EMULSION INTRAVENOUS at 11:44

## 2025-03-20 RX ADMIN — FENTANYL CITRATE 100 MCG: 50 INJECTION INTRAMUSCULAR; INTRAVENOUS at 11:33

## 2025-03-20 RX ADMIN — PROPOFOL 100 MG: 10 INJECTION, EMULSION INTRAVENOUS at 11:33

## 2025-03-20 RX ADMIN — PROPOFOL 50 MG: 10 INJECTION, EMULSION INTRAVENOUS at 11:35

## 2025-03-20 RX ADMIN — PROPOFOL 50 MG: 10 INJECTION, EMULSION INTRAVENOUS at 11:41

## 2025-03-20 RX ADMIN — SODIUM CHLORIDE: 0.9 INJECTION, SOLUTION INTRAVENOUS at 11:25

## 2025-03-20 NOTE — ANESTHESIA POSTPROCEDURE EVALUATION
Post-Op Assessment Note    CV Status:  Stable    Pain management: adequate       Mental Status:  Alert and awake   Hydration Status:  Euvolemic   PONV Controlled:  Controlled   Airway Patency:  Patent     Post Op Vitals Reviewed: Yes    No anethesia notable event occurred.    Staff: Anesthesiologist, with CRNAs           Last Filed PACU Vitals:  Vitals Value Taken Time   Temp 97.1 °F (36.2 °C) 03/20/25 1149   Pulse 96 03/20/25 1149   /63 03/20/25 1149   Resp 18 03/20/25 1149   SpO2 97 % 03/20/25 1149       Modified Shane:     Vitals Value Taken Time   Activity 2 03/20/25 1149   Respiration 2 03/20/25 1149   Circulation 2 03/20/25 1149   Consciousness 2 03/20/25 1149   Oxygen Saturation 2 03/20/25 1149     Modified Shane Score: 10

## 2025-03-20 NOTE — H&P
"History and Physical -  [facility]  Genevieve Mcqueen 29 y.o. female MRN: [id number]                  HPI: Genevieve Mcqueen is a 29 y.o. year old female who presents for EGD for evaluation of dysphagia abdominal pain and altered bowel habits      REVIEW OF SYSTEMS: Per the HPI, and otherwise unremarkable.    Historical Information   Past Medical History:   Diagnosis Date    Acute torn meniscus     Pseudotumor (inflammatory) of orbit      Past Surgical History:   Procedure Laterality Date    WISDOM TOOTH EXTRACTION       Social History   Social History     Substance and Sexual Activity   Alcohol Use Yes    Comment: social/weekends     Social History     Substance and Sexual Activity   Drug Use Yes    Types: Marijuana, Cocaine    Comment: 2x/year     Social History     Tobacco Use   Smoking Status Some Days    Types: Cigarettes    Passive exposure: Current   Smokeless Tobacco Never   Tobacco Comments    1 cigaret monthly     Family History   Problem Relation Age of Onset    Hypertension Mother     Hypertension Father     Macular degeneration Maternal Grandmother     Breast cancer Maternal Grandmother     Hypertension Paternal Grandmother     Diabetes Paternal Grandmother     Hypertension Paternal Grandfather     Diabetes Paternal Uncle     Diabetes Paternal Uncle     Heart attack Paternal Uncle        Meds/Allergies       Current Outpatient Medications:     ergocalciferol (VITAMIN D2) 50,000 units    famotidine (PEPCID) 40 MG tablet    meloxicam (Mobic) 15 mg tablet    tirzepatide (Zepbound) 2.5 mg/0.5 mL auto-injector    Current Facility-Administered Medications:     lactated ringers infusion, 125 mL/hr, Intravenous, Continuous    Allergies   Allergen Reactions    Gluten Meal - Food Allergy Diarrhea and GI Intolerance       Objective     BP (!) 171/83   Pulse 93   Temp 98 °F (36.7 °C) (Tympanic)   Resp 18   Ht 5' 10\" (1.778 m)   Wt (!) 173 kg (381 lb 6.3 oz)   LMP 02/24/2025 (Approximate)   " SpO2 98%   BMI 54.72 kg/m²       PHYSICAL EXAM    Gen: NAD  Head: NCAT  CV: RRR  CHEST: Clear  ABD: soft, NT/ND  EXT: no edema      ASSESSMENT/PLAN:  This is a 29 y.o. year old female here for EGD, and she is stable and optimized for her procedure.

## 2025-03-20 NOTE — ANESTHESIA PREPROCEDURE EVALUATION
Procedure:  EGD    Relevant Problems   ANESTHESIA (within normal limits)      CARDIO   (-) Angina at rest (HCC)   (-) Angina of effort (HCC)   (-) LOZA (dyspnea on exertion)      ENDO (within normal limits)      GI/HEPATIC   (+) Gastroesophageal reflux disease (Well controlled; no vomiting)      /RENAL (within normal limits)      GYN (within normal limits)      HEMATOLOGY (within normal limits)      MUSCULOSKELETAL (within normal limits)      NEURO/PSYCH (within normal limits)      PULMONARY   (-) URI (upper respiratory infection)        Physical Exam    Airway    Mallampati score: IV  TM Distance: >3 FB  Neck ROM: full     Dental   No notable dental hx     Cardiovascular  Rhythm: regular, Rate: normal    Pulmonary   Breath sounds clear to auscultation    Other Findings  post-pubertal.      Anesthesia Plan  ASA Score- 3     Anesthesia Type- IV sedation with anesthesia with ASA Monitors.         Additional Monitors:     Airway Plan:            Plan Factors-Exercise tolerance (METS): >4 METS.    Chart reviewed.        Patient is not a current smoker.              Induction- intravenous.    Postoperative Plan-     Perioperative Resuscitation Plan - Level 1 - Full Code.       Informed Consent- Anesthetic plan and risks discussed with patient.  I personally reviewed this patient with the CRNA. Discussed and agreed on the Anesthesia Plan with the CRNA..      NPO Status:  Vitals Value Taken Time   Date of last liquid 03/19/25 03/20/25 1056   Time of last liquid 2330 03/20/25 1056   Date of last solid 03/19/25 03/20/25 1056   Time of last solid 2330 03/20/25 1056

## 2025-03-26 ENCOUNTER — OFFICE VISIT (OUTPATIENT)
Dept: OBGYN CLINIC | Facility: CLINIC | Age: 30
End: 2025-03-26
Payer: COMMERCIAL

## 2025-03-26 VITALS
WEIGHT: 293 LBS | SYSTOLIC BLOOD PRESSURE: 132 MMHG | DIASTOLIC BLOOD PRESSURE: 88 MMHG | BODY MASS INDEX: 41.95 KG/M2 | HEIGHT: 70 IN

## 2025-03-26 DIAGNOSIS — E28.2 PCOS (POLYCYSTIC OVARIAN SYNDROME): ICD-10-CM

## 2025-03-26 DIAGNOSIS — Z01.411 ENCOUNTER FOR GYNECOLOGICAL EXAMINATION WITH ABNORMAL FINDING: Primary | ICD-10-CM

## 2025-03-26 DIAGNOSIS — Z11.3 SCREENING FOR STDS (SEXUALLY TRANSMITTED DISEASES): ICD-10-CM

## 2025-03-26 PROCEDURE — 87591 N.GONORRHOEAE DNA AMP PROB: CPT | Performed by: PHYSICIAN ASSISTANT

## 2025-03-26 PROCEDURE — 88305 TISSUE EXAM BY PATHOLOGIST: CPT | Performed by: PATHOLOGY

## 2025-03-26 PROCEDURE — G0145 SCR C/V CYTO,THINLAYER,RESCR: HCPCS | Performed by: PHYSICIAN ASSISTANT

## 2025-03-26 PROCEDURE — S0610 ANNUAL GYNECOLOGICAL EXAMINA: HCPCS | Performed by: PHYSICIAN ASSISTANT

## 2025-03-26 PROCEDURE — 87491 CHLMYD TRACH DNA AMP PROBE: CPT | Performed by: PHYSICIAN ASSISTANT

## 2025-03-26 NOTE — PROGRESS NOTES
Name: Genevieve Mcqueen      : 1995      MRN: [id number]  Encounter Provider: Kayla Pearson PA-C  Encounter Date: 3/26/2025   Encounter department: [facility]  :  Assessment & Plan  Encounter for gynecological examination with abnormal finding    Orders:  •  Ambulatory Referral to Obstetrics / Gynecology  •  Liquid-based pap, screening    Screening for STDs (sexually transmitted diseases)    Orders:  •  Chlamydia/GC amplified DNA by PCR    PCOS (polycystic ovarian syndrome)    Orders:  •  US pelvis complete w transvaginal; Future    Pap and GC/chlamydia screening done.  We will call with STD testing results.  Order for pelvic U/S entered as this was not completed previously.  Discussed PCOS with patient.  Recommended hormonal contraception to regulate cycles.  Patient is getting ready to start Zepound, so would avoid OCPs for now.  Patient interested in birth control patch.  Can start as long as pelvic U/S shows no major abnormalities.  F/u to depend on results.  Call with problems in the interim.    History of Present Illness   Patient is a G0 here for yearly gyn exam.  She is new to our office today; this is her first gyn visit.  States she is doing well overall.  Periods are irregular; occur every other month or so.  Bleeding is light but can last from 2-4 weeks.  Recently saw her PCP, who ordered labs.  Patient's testosterone was elevated, so PCOS was suspected.  She was last sexually active a year ago.  Has a history of IBS.  Denies bladder changes, pelvic pain, and thyroid disease.  Has never had a Pap.    Patient is performing self-breast exam.  Denies new masses, skin changes, nipple discharge, and pain/tenderness.  Family history of breast cancer in one grandmother in her 60s.    Genevieve Mcqueen is a 29 y.o. female who presents for yearly gyn exam.  History obtained from: patient    Review of Systems   Constitutional:  Negative for appetite change and  "unexpected weight change.   Cardiovascular:         No masses, skin changes, nipple discharge, and pain/tenderness.   Gastrointestinal:  Negative for abdominal distention, abdominal pain, constipation, diarrhea, nausea and vomiting.   Genitourinary:  Positive for menstrual problem (Irregular menses). Negative for difficulty urinating, dysuria, frequency, genital sores, hematuria, pelvic pain, urgency, vaginal bleeding, vaginal discharge and vaginal pain.          Objective   /88 (BP Location: Left arm, Patient Position: Sitting, Cuff Size: Adult)   Ht 5' 10\" (1.778 m)   Wt (!) 170 kg (375 lb)   LMP 02/24/2025 (Approximate)   BMI 53.81 kg/m²      Physical Exam  Vitals reviewed. Exam conducted with a chaperone present.   Constitutional:       Appearance: Normal appearance. She is well-developed. She is obese.   Neck:      Thyroid: No thyromegaly.   Pulmonary:      Effort: Pulmonary effort is normal.   Chest:   Breasts:     Breasts are symmetrical.      Right: Normal. No swelling, bleeding, inverted nipple, mass, nipple discharge, skin change or tenderness.      Left: Normal. No swelling, bleeding, inverted nipple, mass, nipple discharge, skin change or tenderness.   Abdominal:      General: There is no distension.      Palpations: Abdomen is soft.      Tenderness: There is no abdominal tenderness.   Genitourinary:     General: Normal vulva.      Pubic Area: No rash.       Labia:         Right: No rash, tenderness, lesion or injury.         Left: No rash, tenderness, lesion or injury.       Vagina: Normal. No vaginal discharge, erythema, tenderness or bleeding.      Cervix: Normal.      Uterus: Normal.       Adnexa: Right adnexa normal and left adnexa normal.        Right: No mass, tenderness or fullness.          Left: No mass, tenderness or fullness.     Musculoskeletal:      Cervical back: Neck supple.   Lymphadenopathy:      Cervical: No cervical adenopathy.      Upper Body:      Right upper body: No " supraclavicular or axillary adenopathy.      Left upper body: No supraclavicular or axillary adenopathy.      Lower Body: No right inguinal adenopathy. No left inguinal adenopathy.   Skin:     General: Skin is warm and dry.   Neurological:      Mental Status: She is alert and oriented to person, place, and time.   Psychiatric:         Behavior: Behavior normal. Behavior is cooperative.         Thought Content: Thought content normal.         Judgment: Judgment normal.

## 2025-03-27 ENCOUNTER — PREP FOR PROCEDURE (OUTPATIENT)
Dept: GASTROENTEROLOGY | Facility: CLINIC | Age: 30
End: 2025-03-27

## 2025-03-27 ENCOUNTER — RESULTS FOLLOW-UP (OUTPATIENT)
Dept: GASTROENTEROLOGY | Facility: CLINIC | Age: 30
End: 2025-03-27

## 2025-03-27 ENCOUNTER — TELEPHONE (OUTPATIENT)
Dept: FAMILY MEDICINE CLINIC | Facility: CLINIC | Age: 30
End: 2025-03-27

## 2025-03-27 DIAGNOSIS — K21.00 GASTROESOPHAGEAL REFLUX DISEASE WITH ESOPHAGITIS WITHOUT HEMORRHAGE: ICD-10-CM

## 2025-03-27 DIAGNOSIS — R13.19 ESOPHAGEAL DYSPHAGIA: Primary | ICD-10-CM

## 2025-03-27 DIAGNOSIS — K20.0 EOSINOPHILIC ESOPHAGITIS: ICD-10-CM

## 2025-03-27 RX ORDER — OMEPRAZOLE 40 MG/1
40 CAPSULE, DELAYED RELEASE ORAL
Qty: 60 CAPSULE | Refills: 3 | Status: SHIPPED | OUTPATIENT
Start: 2025-03-27

## 2025-03-27 RX ORDER — SODIUM CHLORIDE, SODIUM LACTATE, POTASSIUM CHLORIDE, CALCIUM CHLORIDE 600; 310; 30; 20 MG/100ML; MG/100ML; MG/100ML; MG/100ML
125 INJECTION, SOLUTION INTRAVENOUS CONTINUOUS
OUTPATIENT
Start: 2025-03-27

## 2025-03-28 ENCOUNTER — RESULTS FOLLOW-UP (OUTPATIENT)
Dept: OBGYN CLINIC | Facility: CLINIC | Age: 30
End: 2025-03-28

## 2025-03-28 DIAGNOSIS — E28.2 PCOS (POLYCYSTIC OVARIAN SYNDROME): Primary | ICD-10-CM

## 2025-03-28 LAB
C TRACH DNA SPEC QL NAA+PROBE: NEGATIVE
N GONORRHOEA DNA SPEC QL NAA+PROBE: NEGATIVE

## 2025-03-28 NOTE — TELEPHONE ENCOUNTER
PA Zepbound 2.5MG/0.5ML  APPROVED         Patient advised by          []MyChart Message  []Phone call   [x]LMOM  []L/M to call office as no active Communication consent on file  []Unable to leave detailed message as VM not approved on Communication consent       Pharmacy advised by    [x]Fax  []Phone call  []Secure Chat    Specialty Pharmacy    []

## 2025-03-28 NOTE — TELEPHONE ENCOUNTER
PA Zepbound 2.5MG/0.5ML SUBMITTED     to [employer]     via    []CMM-KEY:    [x]Surescripts-Case ID # [id number]  []Availity-Auth ID #  NDC #    []Faxed to plan   []Other website    []Phone call Case ID #      []PA sent as URGENT    All office notes, labs and other pertaining documents and studies sent. Clinical questions answered. Awaiting determination from insurance company.     Turnaround time for your insurance to make a decision on your Prior Authorization can take 7-21 business days.

## 2025-03-28 NOTE — RESULT ENCOUNTER NOTE
I called her with her results.  Will treat with omeprazole 40 twice a day and repeat EGD in 3 months to repeat biopsies.

## 2025-03-31 LAB
LAB AP GYN PRIMARY INTERPRETATION: NORMAL
Lab: NORMAL

## 2025-03-31 RX ORDER — TIRZEPATIDE 2.5 MG/.5ML
2.5 INJECTION, SOLUTION SUBCUTANEOUS WEEKLY
Qty: 2 ML | Refills: 0 | Status: SHIPPED | OUTPATIENT
Start: 2025-03-31 | End: 2025-04-28

## 2025-04-01 ENCOUNTER — TELEPHONE (OUTPATIENT)
Dept: GASTROENTEROLOGY | Facility: CLINIC | Age: 30
End: 2025-04-01

## 2025-04-01 NOTE — TELEPHONE ENCOUNTER
Scheduled date of EGD(as of today):7/11/25  Physician performing EGD:Dr Moy  Location of EGD:[facility]  Instructions reviewed with patient by:cralos  Clearances: na

## 2025-04-01 NOTE — TELEPHONE ENCOUNTER
----- Message from Melissa GARCIA sent at 3/31/2025  4:30 PM EDT -----    ----- Message -----  From: Kiana Shculz MA  Sent: 3/31/2025   4:22 PM EDT  To: #    3 month recall for egd per Dr. Moy

## 2025-04-01 NOTE — TELEPHONE ENCOUNTER
Called and left a message for the patient to call back to schedule repeat EGD in 3 months with Dr Moy

## 2025-04-09 ENCOUNTER — HOSPITAL ENCOUNTER (OUTPATIENT)
Dept: RADIOLOGY | Facility: HOSPITAL | Age: 30
Discharge: HOME | End: 2025-04-09
Payer: COMMERCIAL

## 2025-04-09 DIAGNOSIS — E28.2 PCOS (POLYCYSTIC OVARIAN SYNDROME): ICD-10-CM

## 2025-04-09 PROCEDURE — 76856 US EXAM PELVIC COMPLETE: CPT

## 2025-04-09 PROCEDURE — 76830 TRANSVAGINAL US NON-OB: CPT

## 2025-04-10 ENCOUNTER — HOSPITAL ENCOUNTER (OUTPATIENT)
Dept: RADIOLOGY | Facility: HOSPITAL | Age: 30
Discharge: HOME | End: 2025-04-10
Payer: COMMERCIAL

## 2025-04-10 DIAGNOSIS — R10.9 RIGHT SIDED ABDOMINAL PAIN: ICD-10-CM

## 2025-04-10 PROCEDURE — 76705 ECHO EXAM OF ABDOMEN: CPT

## 2025-04-11 ENCOUNTER — TELEPHONE (OUTPATIENT)
Age: 30
End: 2025-04-11

## 2025-04-11 ENCOUNTER — RESULTS FOLLOW-UP (OUTPATIENT)
Dept: GASTROENTEROLOGY | Facility: CLINIC | Age: 30
End: 2025-04-11

## 2025-04-11 RX ORDER — NORELGESTROMIN AND ETHINYL ESTRADIOL 35; 150 UG/MG; UG/MG
1 PATCH TRANSDERMAL WEEKLY
Qty: 3 PATCH | Refills: 3 | Status: SHIPPED | OUTPATIENT
Start: 2025-04-11

## 2025-04-11 NOTE — TELEPHONE ENCOUNTER
Spoke with patient regarding pelvic U/S results.  Uterus WNL; endometrium measured 12 mm.  Ovaries have the appearance of PCOS.  Will start birth control patch as discussed.  Rx sent to pharmacy x 3 refills.  Place first patch first day of next period.  Instructions for use given.  F/u in 3 mos for recheck; patient to call to schedule.  Call with problems.

## 2025-04-16 ENCOUNTER — OFFICE VISIT (OUTPATIENT)
Age: 30
End: 2025-04-16
Payer: COMMERCIAL

## 2025-04-16 VITALS — HEIGHT: 70 IN | BODY MASS INDEX: 41.95 KG/M2 | WEIGHT: 293 LBS

## 2025-04-16 DIAGNOSIS — M76.822 POSTERIOR TIBIAL TENDINITIS OF LEFT LOWER EXTREMITY: Primary | ICD-10-CM

## 2025-04-16 PROCEDURE — 99213 OFFICE O/P EST LOW 20 MIN: CPT | Performed by: STUDENT IN AN ORGANIZED HEALTH CARE EDUCATION/TRAINING PROGRAM

## 2025-04-16 NOTE — PROGRESS NOTES
"[facility] Office Visit    ASSESSMENT     Diagnoses and all orders for this visit:    Posterior tibial tendinitis of left lower extremity         Problem List Items Addressed This Visit    None  Visit Diagnoses         Posterior tibial tendinitis of left lower extremity    -  Primary          PLAN  Genevieve and I had a discussion about her left foot.  Her new area of pain is most consistent with posterior tibial tendinitis.  I did recommend that she continue to use her meloxicam at home, which she was prescribed at her last visit.  I also recommended that she continue wearing supportive sneakers, however we did speak about improving her inserts to help reduce her mild pes planus deformity.  I also provided her with a list of at home exercises to perform to help with her posterior tibial tendinitis. She can call my office for any new or worsening podiatric concerns at this time.    SUBJECTIVE    Chief Complaint:  Left ankle pain     Patient ID: Genevieve Vallejo presents today for reevaluation of her left foot. She states that her plantar fasciitis pain has improved since her last visit. She now mentions that she has been having some swelling in her left ankle. Denies having any pain in her ankle.       The following portions of the patient's history were reviewed and updated as appropriate: allergies, current medications, past family history, past medical history, past social history, past surgical history and problem list.    Review of Systems   Constitutional: Negative.    HENT: Negative.     Respiratory: Negative.     Cardiovascular: Negative.    Gastrointestinal: Negative.    Musculoskeletal:  Positive for myalgias.   Skin: Negative.    Neurological: Negative.          OBJECTIVE      Ht 5' 10\" (1.778 m)   Wt (!) 170 kg (375 lb)   BMI 53.81 kg/m²        Physical Exam  Constitutional:       Appearance: Normal appearance.   HENT:      Head: Normocephalic and " atraumatic.   Eyes:      General:         Right eye: No discharge.         Left eye: No discharge.   Cardiovascular:      Rate and Rhythm: Normal rate and regular rhythm.      Pulses:           Dorsalis pedis pulses are 2+ on the right side and 2+ on the left side.        Posterior tibial pulses are 2+ on the right side and 2+ on the left side.   Pulmonary:      Effort: Pulmonary effort is normal.      Breath sounds: Normal breath sounds.   Skin:     General: Skin is warm.      Capillary Refill: Capillary refill takes less than 2 seconds.   Neurological:      Sensory: Sensation is intact. No sensory deficit.         Vascular:  -DP and PT pulses intact b/l  -Capillary refill time <2 sec b/l  -Digital hair growth: Present  -Skin temp: WNL    MSK:  -Pain on palpation to the left ankle just inferior to the medial malleolus continuing distally towards the navicular tuberosity at the insertion site of the posterior tibial tendon  -Pain with plantarflexion and inversion against resistance  -No gross deformities noted   -MMT is 5/5 to all muscle compartments of the lower extremity  -Ankle dorsiflexion >10 degrees with knee extended and knee flexed b/l    Neuro:  -Light sensation intact bilaterally  -Protective sensation intact bilaterally    Derm:  -No lesions, abrasions, or open wounds noted  -No noted interdigital maceration, peeling, malodor  -No callus formation noted on exam

## 2025-04-22 ENCOUNTER — APPOINTMENT (OUTPATIENT)
Dept: LAB | Facility: HOSPITAL | Age: 30
End: 2025-04-22
Payer: COMMERCIAL

## 2025-04-22 ENCOUNTER — TRANSCRIBE ORDERS (OUTPATIENT)
Dept: LAB | Facility: CLINIC | Age: 30
End: 2025-04-22

## 2025-04-22 ENCOUNTER — OFFICE VISIT (OUTPATIENT)
Dept: FAMILY MEDICINE CLINIC | Facility: CLINIC | Age: 30
End: 2025-04-22
Payer: COMMERCIAL

## 2025-04-22 ENCOUNTER — APPOINTMENT (OUTPATIENT)
Dept: LAB | Facility: CLINIC | Age: 30
End: 2025-04-22
Attending: PHYSICIAN ASSISTANT
Payer: COMMERCIAL

## 2025-04-22 VITALS
HEART RATE: 100 BPM | RESPIRATION RATE: 18 BRPM | TEMPERATURE: 98.9 F | WEIGHT: 293 LBS | DIASTOLIC BLOOD PRESSURE: 94 MMHG | HEIGHT: 70 IN | SYSTOLIC BLOOD PRESSURE: 154 MMHG | BODY MASS INDEX: 41.95 KG/M2

## 2025-04-22 DIAGNOSIS — R19.7 DIARRHEA, UNSPECIFIED TYPE: Primary | ICD-10-CM

## 2025-04-22 DIAGNOSIS — R19.7 DIARRHEA, UNSPECIFIED TYPE: ICD-10-CM

## 2025-04-22 PROCEDURE — 99214 OFFICE O/P EST MOD 30 MIN: CPT | Performed by: FAMILY MEDICINE

## 2025-04-22 PROCEDURE — 87177 OVA AND PARASITES SMEARS: CPT

## 2025-04-22 PROCEDURE — 83993 ASSAY FOR CALPROTECTIN FECAL: CPT

## 2025-04-22 PROCEDURE — 87209 SMEAR COMPLEX STAIN: CPT

## 2025-04-22 PROCEDURE — 87505 NFCT AGENT DETECTION GI: CPT

## 2025-04-22 NOTE — PROGRESS NOTES
"Name: Genevieve Mcqueen      : 1995      MRN: [id number]  Encounter Provider: Eleanor Gee MD  Encounter Date: 2025   Encounter department: [facility]  :  Assessment & Plan  Diarrhea, unspecified type  She has had watery diarrhea since starting zepbound, for now recommend holding zepbound until diarrhea improves. She also has IBS and follows GI for this- but feels like this diarrhea is worse than her usual IBS diarrhea. If no improvement or she develops worsening symptoms, will get stool testing. Her GI also added other stool tests which are in her chart. Recommend increased hydration, bland diet. If pain is severe, aware to go to the ER for imaging.   Orders:    Stool Enteric Bacterial Panel by PCR; Future    Clostridioides difficile toxin by PCR with EIA; Future           History of Present Illness   Diarrhea   This is a new problem. Episode onset: 25. The problem occurs 5 to 10 times per day. The problem has been unchanged. The stool consistency is described as Watery. Associated symptoms include abdominal pain and vomiting. Pertinent negatives include no arthralgias, bloating, chills, coughing, fever, headaches, increased  flatus, myalgias, sweats, URI or weight loss. Risk factors: recently started zepbound.       Review of Systems   Constitutional:  Negative for chills, fever and weight loss.   Respiratory:  Negative for cough.    Gastrointestinal:  Positive for abdominal pain, diarrhea and vomiting. Negative for bloating and flatus.   Musculoskeletal:  Negative for arthralgias and myalgias.   Neurological:  Negative for headaches.       Objective   /94   Pulse 100   Temp 98.9 °F (37.2 °C)   Resp 18   Ht 5' 10\" (1.778 m)   Wt (!) 168 kg (370 lb 9.6 oz)   BMI 53.18 kg/m²      Physical Exam  Constitutional:       General: She is not in acute distress.     Appearance: She is well-developed. She is not diaphoretic.   HENT:      Head: Normocephalic and atraumatic. "   Cardiovascular:      Rate and Rhythm: Normal rate and regular rhythm.      Heart sounds: Normal heart sounds. No murmur heard.     No friction rub. No gallop.   Pulmonary:      Effort: Pulmonary effort is normal. No respiratory distress.      Breath sounds: Normal breath sounds. No wheezing or rales.   Chest:      Chest wall: No tenderness.   Abdominal:      General: Abdomen is flat. There is no distension.      Palpations: Abdomen is soft. There is no mass.      Tenderness: There is no abdominal tenderness. There is no right CVA tenderness, left CVA tenderness, guarding or rebound.      Hernia: No hernia is present.   Musculoskeletal:         General: No deformity. Normal range of motion.      Cervical back: Normal range of motion and neck supple.   Skin:     General: Skin is warm and dry.   Neurological:      General: No focal deficit present.      Mental Status: She is alert and oriented to person, place, and time.   Psychiatric:         Behavior: Behavior normal.         Thought Content: Thought content normal.         Judgment: Judgment normal.

## 2025-04-23 LAB
C COLI+JEJUNI TUF STL QL NAA+PROBE: NEGATIVE
C DIFF TOX GENS STL QL NAA+PROBE: NEGATIVE
CALPROTECTIN STL-MCNC: 125 ÂΜG/G
EC STX1+STX2 GENES STL QL NAA+PROBE: NEGATIVE
SALMONELLA SP SPAO STL QL NAA+PROBE: NEGATIVE
SHIGELLA SP+EIEC IPAH STL QL NAA+PROBE: NEGATIVE

## 2025-04-24 ENCOUNTER — RESULTS FOLLOW-UP (OUTPATIENT)
Dept: GASTROENTEROLOGY | Facility: CLINIC | Age: 30
End: 2025-04-24

## 2025-05-06 ENCOUNTER — PREP FOR PROCEDURE (OUTPATIENT)
Dept: GASTROENTEROLOGY | Facility: CLINIC | Age: 30
End: 2025-05-06

## 2025-05-06 ENCOUNTER — TELEPHONE (OUTPATIENT)
Dept: GASTROENTEROLOGY | Facility: CLINIC | Age: 30
End: 2025-05-06

## 2025-05-06 DIAGNOSIS — R19.5 ELEVATED FECAL CALPROTECTIN: ICD-10-CM

## 2025-05-06 DIAGNOSIS — R10.9 RIGHT SIDED ABDOMINAL PAIN: Primary | ICD-10-CM

## 2025-05-06 DIAGNOSIS — R19.7 DIARRHEA, UNSPECIFIED TYPE: ICD-10-CM

## 2025-05-06 RX ORDER — SODIUM CHLORIDE, SODIUM LACTATE, POTASSIUM CHLORIDE, CALCIUM CHLORIDE 600; 310; 30; 20 MG/100ML; MG/100ML; MG/100ML; MG/100ML
125 INJECTION, SOLUTION INTRAVENOUS CONTINUOUS
OUTPATIENT
Start: 2025-05-06

## 2025-05-06 NOTE — TELEPHONE ENCOUNTER
Colonoscopy added to EGD.  Lm for pt to cb to schedule for earlier procedure and that colon has been added on.  Sent prep instructions via [employer]

## 2025-05-06 NOTE — TELEPHONE ENCOUNTER
----- Message from Jaye Bowles PA-C sent at 5/6/2025 12:20 PM EDT -----  Regarding: Adding on colon to EGD  Please contact patient and let her know that I ordered a colonoscopy to be completed at time of her upcoming repeat EGD.  She can undergo MiraLAX/Dulcolax bowel prep.  If she could complete her procedures sooner, sometime later this month and may or June that would be great.  Please contact patient.    Thank you so much!  Jaye

## 2025-05-07 NOTE — TELEPHONE ENCOUNTER
Scheduled date of EGD/colonoscopy (as of today):06.03.25  Physician performing EGD/colonoscopy:DR BULLOCK  Location of EGD/colonoscopy:[facility]  Desired bowel prep reviewed with patient:KERRI/MELONIE  Instructions reviewed with patient by:SENT [email or web address] [email or web address]  Clearances:  N/A

## 2025-05-30 ENCOUNTER — TELEPHONE (OUTPATIENT)
Age: 30
End: 2025-05-30

## 2025-05-30 NOTE — TELEPHONE ENCOUNTER
Pt calling stating she was layed off and she will have [employer] insurance as of 6/1/25. Pt has EGD/Colon 6/3/25 and wants to know out of pocket. I informed pt to call  and if they cannot help her to call [employer] and they should be able to answer what she has to pay out of pocket.

## 2025-06-02 ENCOUNTER — TELEPHONE (OUTPATIENT)
Dept: GASTROENTEROLOGY | Facility: CLINIC | Age: 30
End: 2025-06-02

## 2025-06-02 NOTE — TELEPHONE ENCOUNTER
----- Message from Juliann HOLT sent at 6/2/2025  1:14 PM EDT -----  Regarding: PLEASE CANCEL  Patients insurance is inactive -I spoke to patient and she said she just got laid off and would like to cancel her procedure. She said she will give the office a call back to reschedule once she gets new insurance. Thank you!

## 2025-06-11 ENCOUNTER — OFFICE VISIT (OUTPATIENT)
Dept: FAMILY MEDICINE CLINIC | Facility: CLINIC | Age: 30
End: 2025-06-11
Payer: COMMERCIAL

## 2025-06-11 VITALS
TEMPERATURE: 98.6 F | HEIGHT: 70 IN | WEIGHT: 293 LBS | HEART RATE: 78 BPM | SYSTOLIC BLOOD PRESSURE: 148 MMHG | BODY MASS INDEX: 41.95 KG/M2 | DIASTOLIC BLOOD PRESSURE: 102 MMHG

## 2025-06-11 DIAGNOSIS — G43.909 MIGRAINE WITHOUT STATUS MIGRAINOSUS, NOT INTRACTABLE, UNSPECIFIED MIGRAINE TYPE: ICD-10-CM

## 2025-06-11 DIAGNOSIS — E66.09 OBESITY DUE TO EXCESS CALORIES WITHOUT SERIOUS COMORBIDITY, UNSPECIFIED CLASS: Primary | ICD-10-CM

## 2025-06-11 DIAGNOSIS — I10 ESSENTIAL HYPERTENSION: ICD-10-CM

## 2025-06-11 PROCEDURE — 99214 OFFICE O/P EST MOD 30 MIN: CPT | Performed by: NURSE PRACTITIONER

## 2025-06-11 RX ORDER — TOPIRAMATE 25 MG/1
25 TABLET, FILM COATED ORAL 2 TIMES DAILY
Qty: 60 TABLET | Refills: 3 | Status: SHIPPED | OUTPATIENT
Start: 2025-06-11

## 2025-06-11 RX ORDER — TOPIRAMATE SPINKLE 25 MG/1
25 CAPSULE ORAL 2 TIMES DAILY
Qty: 60 CAPSULE | Refills: 2 | Status: SHIPPED | OUTPATIENT
Start: 2025-06-11 | End: 2025-06-11

## 2025-06-11 RX ORDER — LOSARTAN POTASSIUM 50 MG/1
50 TABLET ORAL DAILY
Qty: 30 TABLET | Refills: 3 | Status: SHIPPED | OUTPATIENT
Start: 2025-06-11

## 2025-06-11 NOTE — ASSESSMENT & PLAN NOTE
Previously well-controlled with Topamax.  Discussed that this may also be beneficial to help with weight loss    Orders:    Ambulatory referral to Neurology; Future    topiramate (Topamax) 25 mg tablet; Take 1 tablet (25 mg total) by mouth 2 (two) times a day

## 2025-06-11 NOTE — PROGRESS NOTES
"Name: Genevieve Mcqueen      : 1995      MRN: [id number]  Encounter Provider: PATRICE Oviedo  Encounter Date: 2025   Encounter department: [facility]  :  Assessment & Plan  Obesity due to excess calories without serious comorbidity, unspecified class  We will plan to refer to weight management in the future pending job/insurance       Essential hypertension  Reviewed prior BP readings, this has been elevated for some time now.  She is agreeable to starting medication.  Reviewed potential side effects and has this taken.  Orders:    losartan (COZAAR) 50 mg tablet; Take 1 tablet (50 mg total) by mouth daily    Migraine without status migrainosus, not intractable, unspecified migraine type  Previously well-controlled with Topamax.  Discussed that this may also be beneficial to help with weight loss    Orders:    Ambulatory referral to Neurology; Future    topiramate (Topamax) 25 mg tablet; Take 1 tablet (25 mg total) by mouth 2 (two) times a day           History of Present Illness   Here today for follow-up.  She developed diarrhea after starting Zepbound, which continued for a month following.  She is hesitant to restart this.  She is following with GI for her IBS.  Has been under some increased stress recently, was laid off from her job.  She is following with [employer] and trying to exercise more she has orthopedic standpoint.  Requesting referral for neurology.  She has a history of migraine headaches, and these have been recurring.  She was previously on Topamax      Review of Systems   Constitutional: Negative.    Respiratory: Negative.     Cardiovascular: Negative.    Gastrointestinal:         See HPI   Neurological:  Positive for headaches.       Objective   BP (!) 148/102 (BP Location: Left arm, Patient Position: Sitting, Cuff Size: Large)   Pulse 78   Temp 98.6 °F (37 °C) (Temporal)   Ht 5' 10\" (1.778 m)   Wt (!) 166 kg (366 lb 9.6 oz)   LMP 2025   BMI 52.60 " kg/m²      Physical Exam  Vitals and nursing note reviewed.   Constitutional:       General: She is not in acute distress.     Appearance: Normal appearance.   HENT:      Head: Normocephalic and atraumatic.     Eyes:      Conjunctiva/sclera: Conjunctivae normal.     Neck:      Vascular: No carotid bruit.     Cardiovascular:      Rate and Rhythm: Normal rate and regular rhythm.      Pulses: Normal pulses.      Heart sounds: Normal heart sounds. No murmur heard.  Pulmonary:      Effort: Pulmonary effort is normal.      Breath sounds: Normal breath sounds.     Skin:     General: Skin is warm and dry.     Neurological:      Mental Status: She is alert.     Psychiatric:         Mood and Affect: Mood normal.         Behavior: Behavior normal.

## 2025-06-11 NOTE — ASSESSMENT & PLAN NOTE
Reviewed prior BP readings, this has been elevated for some time now.  She is agreeable to starting medication.  Reviewed potential side effects and has this taken.  Orders:    losartan (COZAAR) 50 mg tablet; Take 1 tablet (50 mg total) by mouth daily

## 2025-06-12 ENCOUNTER — ANESTHESIA EVENT (OUTPATIENT)
Dept: ANESTHESIOLOGY | Facility: HOSPITAL | Age: 30
End: 2025-06-12

## 2025-06-12 ENCOUNTER — ANESTHESIA (OUTPATIENT)
Dept: ANESTHESIOLOGY | Facility: HOSPITAL | Age: 30
End: 2025-06-12

## 2025-06-13 ENCOUNTER — TELEPHONE (OUTPATIENT)
Dept: GASTROENTEROLOGY | Facility: AMBULARY SURGERY CENTER | Age: 30
End: 2025-06-13

## 2025-06-13 NOTE — TELEPHONE ENCOUNTER
----- Message -----  From: Jerel Koroma MD  Sent: 6/12/2025   8:08 AM EDT  To: [facility] Reschedule Pool    Patient needs to have procedure done in the hospital due to BMI of 52.    Please move to the hospital, thank you

## 2025-06-17 RX ORDER — SODIUM CHLORIDE, SODIUM LACTATE, POTASSIUM CHLORIDE, CALCIUM CHLORIDE 600; 310; 30; 20 MG/100ML; MG/100ML; MG/100ML; MG/100ML
75 INJECTION, SOLUTION INTRAVENOUS CONTINUOUS
Status: CANCELLED | OUTPATIENT
Start: 2025-06-17

## 2025-06-18 ENCOUNTER — HOSPITAL ENCOUNTER (OUTPATIENT)
Dept: PERIOP | Facility: HOSPITAL | Age: 30
Setting detail: OUTPATIENT SURGERY
Discharge: HOME | End: 2025-06-18
Attending: INTERNAL MEDICINE
Payer: COMMERCIAL

## 2025-06-18 ENCOUNTER — ANESTHESIA EVENT (OUTPATIENT)
Dept: PERIOP | Facility: HOSPITAL | Age: 30
End: 2025-06-18
Payer: COMMERCIAL

## 2025-06-18 ENCOUNTER — ANESTHESIA (OUTPATIENT)
Dept: PERIOP | Facility: HOSPITAL | Age: 30
End: 2025-06-18
Payer: COMMERCIAL

## 2025-06-18 VITALS
DIASTOLIC BLOOD PRESSURE: 70 MMHG | OXYGEN SATURATION: 99 % | TEMPERATURE: 98.4 F | RESPIRATION RATE: 16 BRPM | SYSTOLIC BLOOD PRESSURE: 157 MMHG | HEART RATE: 68 BPM

## 2025-06-18 DIAGNOSIS — R19.7 DIARRHEA, UNSPECIFIED TYPE: ICD-10-CM

## 2025-06-18 DIAGNOSIS — R10.9 RIGHT SIDED ABDOMINAL PAIN: ICD-10-CM

## 2025-06-18 DIAGNOSIS — R19.5 ELEVATED FECAL CALPROTECTIN: ICD-10-CM

## 2025-06-18 DIAGNOSIS — K21.00 GASTROESOPHAGEAL REFLUX DISEASE WITH ESOPHAGITIS WITHOUT HEMORRHAGE: ICD-10-CM

## 2025-06-18 DIAGNOSIS — K20.0 EOSINOPHILIC ESOPHAGITIS: ICD-10-CM

## 2025-06-18 DIAGNOSIS — R13.19 ESOPHAGEAL DYSPHAGIA: ICD-10-CM

## 2025-06-18 LAB
EXT PREGNANCY TEST URINE: NEGATIVE
EXT. CONTROL: NORMAL

## 2025-06-18 PROCEDURE — 88305 TISSUE EXAM BY PATHOLOGIST: CPT | Performed by: PATHOLOGY

## 2025-06-18 PROCEDURE — 81025 URINE PREGNANCY TEST: CPT | Performed by: ANESTHESIOLOGY

## 2025-06-18 PROCEDURE — 43239 EGD BIOPSY SINGLE/MULTIPLE: CPT | Performed by: INTERNAL MEDICINE

## 2025-06-18 PROCEDURE — 45380 COLONOSCOPY AND BIOPSY: CPT | Performed by: INTERNAL MEDICINE

## 2025-06-18 RX ORDER — PROPOFOL 10 MG/ML
INJECTION, EMULSION INTRAVENOUS AS NEEDED
Status: DISCONTINUED | OUTPATIENT
Start: 2025-06-18 | End: 2025-06-18

## 2025-06-18 RX ORDER — SODIUM CHLORIDE, SODIUM LACTATE, POTASSIUM CHLORIDE, CALCIUM CHLORIDE 600; 310; 30; 20 MG/100ML; MG/100ML; MG/100ML; MG/100ML
125 INJECTION, SOLUTION INTRAVENOUS CONTINUOUS
Status: DISCONTINUED | OUTPATIENT
Start: 2025-06-18 | End: 2025-06-22 | Stop reason: HOSPADM

## 2025-06-18 RX ORDER — LIDOCAINE HYDROCHLORIDE 10 MG/ML
INJECTION, SOLUTION EPIDURAL; INFILTRATION; INTRACAUDAL; PERINEURAL AS NEEDED
Status: DISCONTINUED | OUTPATIENT
Start: 2025-06-18 | End: 2025-06-18

## 2025-06-18 RX ORDER — SODIUM CHLORIDE, SODIUM LACTATE, POTASSIUM CHLORIDE, CALCIUM CHLORIDE 600; 310; 30; 20 MG/100ML; MG/100ML; MG/100ML; MG/100ML
75 INJECTION, SOLUTION INTRAVENOUS CONTINUOUS
Status: DISCONTINUED | OUTPATIENT
Start: 2025-06-18 | End: 2025-06-22 | Stop reason: HOSPADM

## 2025-06-18 RX ORDER — SODIUM CHLORIDE, SODIUM LACTATE, POTASSIUM CHLORIDE, CALCIUM CHLORIDE 600; 310; 30; 20 MG/100ML; MG/100ML; MG/100ML; MG/100ML
INJECTION, SOLUTION INTRAVENOUS CONTINUOUS PRN
Status: DISCONTINUED | OUTPATIENT
Start: 2025-06-18 | End: 2025-06-18

## 2025-06-18 RX ADMIN — PROPOFOL 300 MG: 10 INJECTION, EMULSION INTRAVENOUS at 12:33

## 2025-06-18 RX ADMIN — LIDOCAINE HYDROCHLORIDE 5 ML: 10 INJECTION, SOLUTION EPIDURAL; INFILTRATION; INTRACAUDAL; PERINEURAL at 12:33

## 2025-06-18 RX ADMIN — PROPOFOL 150 MG: 10 INJECTION, EMULSION INTRAVENOUS at 12:50

## 2025-06-18 RX ADMIN — PROPOFOL 50 MG: 10 INJECTION, EMULSION INTRAVENOUS at 12:36

## 2025-06-18 RX ADMIN — PROPOFOL 150 MG: 10 INJECTION, EMULSION INTRAVENOUS at 12:46

## 2025-06-18 RX ADMIN — PROPOFOL 100 MG: 10 INJECTION, EMULSION INTRAVENOUS at 12:37

## 2025-06-18 RX ADMIN — PROPOFOL 50 MG: 10 INJECTION, EMULSION INTRAVENOUS at 12:39

## 2025-06-18 RX ADMIN — SODIUM CHLORIDE, SODIUM LACTATE, POTASSIUM CHLORIDE, AND CALCIUM CHLORIDE 75 ML/HR: .6; .31; .03; .02 INJECTION, SOLUTION INTRAVENOUS at 11:02

## 2025-06-18 RX ADMIN — PROPOFOL 50 MG: 10 INJECTION, EMULSION INTRAVENOUS at 12:42

## 2025-06-18 RX ADMIN — PROPOFOL 50 MG: 10 INJECTION, EMULSION INTRAVENOUS at 12:41

## 2025-06-18 RX ADMIN — SODIUM CHLORIDE, SODIUM LACTATE, POTASSIUM CHLORIDE, AND CALCIUM CHLORIDE: .6; .31; .03; .02 INJECTION, SOLUTION INTRAVENOUS at 12:14

## 2025-06-18 NOTE — ANESTHESIA POSTPROCEDURE EVALUATION
Post-Op Assessment Note    CV Status:  Stable         Mental Status:  Alert   PONV Controlled:  Controlled   Airway Patency:  Patent     Post Op Vitals Reviewed: Yes    No anethesia notable event occurred.    Staff: CRNA           Last Filed PACU Vitals:  Vitals Value Taken Time   Temp     Pulse 95    BP     Resp 20    SpO2 95

## 2025-06-18 NOTE — H&P
History and Physical -  [facility]  Genevieve Mcqueen 29 y.o. female MRN: [id number]    HPI: Genevieve Mcqueen is a 29 y.o. year old female who presents with dysphagia, abd pain, change in bowel movements.       Review of Systems    Historical Information   Past Medical History[1]  Past Surgical History[2]  Social History   Social History     Substance and Sexual Activity   Alcohol Use Yes    Comment: social/weekends     Social History     Substance and Sexual Activity   Drug Use Yes    Types: Marijuana, Cocaine    Comment: 2x/year- socially     Tobacco Use History[3]  Family History[4]    Meds/Allergies     Not in a hospital admission.    Allergies[5]    Objective     BP (!) 179/97   Pulse 85   Temp 98.4 °F (36.9 °C) (Skin)   Resp 18   LMP 05/28/2025   SpO2 97%       PHYSICAL EXAM    Gen: NAD  CV: RRR  CHEST: Clear  ABD: soft, NT/ND  EXT: no edema  Neuro: AAO      ASSESSMENT/PLAN:  This is a 29 y.o. year old female here for dysphagia, abd pain, change in bowel movements.   PLAN:   Procedure: egd/colonoscopy           [1]   Past Medical History:  Diagnosis Date    Acute torn meniscus     Headache(784.0)     Obesity     Pseudotumor (inflammatory) of orbit    [2]   Past Surgical History:  Procedure Laterality Date    WISDOM TOOTH EXTRACTION     [3]   Social History  Tobacco Use   Smoking Status Some Days    Types: Cigarettes    Passive exposure: Current   Smokeless Tobacco Never   Tobacco Comments    1 cigaret monthly   [4]   Family History  Problem Relation Name Age of Onset    Hypertension Mother Avila Mcqueen     Hypertension Father Dex Mcqueen     Macular degeneration Maternal Grandmother      Breast cancer Maternal Grandmother      Hypertension Paternal Grandmother      Diabetes Paternal Grandmother      Hypertension Paternal Grandfather      Diabetes Paternal Uncle      Diabetes Paternal Uncle      Heart attack Paternal Uncle     [5]   Allergies  Allergen Reactions    Gluten Meal - Food  Allergy Diarrhea and GI Intolerance

## 2025-06-18 NOTE — ANESTHESIA PREPROCEDURE EVALUATION
Procedure:  EGD  COLONOSCOPY    Relevant Problems   CARDIO   (+) Essential hypertension   (+) Migraine without status migrainosus, not intractable      GI/HEPATIC   (+) Gastroesophageal reflux disease      NEURO/PSYCH   (+) Migraine without status migrainosus, not intractable        Physical Exam    Airway     Mallampati score: II  TM Distance: >3 FB  Neck ROM: full      Cardiovascular  Cardiovascular exam normal    Dental       Pulmonary  Pulmonary exam normal     Neurological      Other Findings  post-pubertal.      Anesthesia Plan  ASA Score- 3     Anesthesia Type- IV sedation with anesthesia with ASA Monitors.         Additional Monitors:     Airway Plan:            Plan Factors-Exercise tolerance (METS): >4 METS.    Chart reviewed. EKG reviewed. Imaging results reviewed. Existing labs reviewed. Patient summary reviewed.                  Induction- intravenous.    Postoperative Plan-         Informed Consent- Anesthetic plan and risks discussed with patient.  I personally reviewed this patient with the CRNA. Discussed and agreed on the Anesthesia Plan with the CRNA..      NPO Status:  No vitals data found for the desired time range.

## 2025-06-23 PROCEDURE — 88305 TISSUE EXAM BY PATHOLOGIST: CPT | Performed by: PATHOLOGY

## 2025-06-24 ENCOUNTER — OFFICE VISIT (OUTPATIENT)
Age: 30
End: 2025-06-24
Attending: NURSE PRACTITIONER
Payer: COMMERCIAL

## 2025-06-24 VITALS
WEIGHT: 293 LBS | HEART RATE: 95 BPM | HEIGHT: 70 IN | BODY MASS INDEX: 41.95 KG/M2 | SYSTOLIC BLOOD PRESSURE: 140 MMHG | DIASTOLIC BLOOD PRESSURE: 100 MMHG

## 2025-06-24 DIAGNOSIS — G43.909 MIGRAINE WITHOUT STATUS MIGRAINOSUS, NOT INTRACTABLE, UNSPECIFIED MIGRAINE TYPE: ICD-10-CM

## 2025-06-24 PROCEDURE — 99244 OFF/OP CNSLTJ NEW/EST MOD 40: CPT

## 2025-06-24 RX ORDER — RIZATRIPTAN BENZOATE 10 MG/1
10 TABLET ORAL AS NEEDED
Qty: 18 TABLET | Refills: 6 | Status: SHIPPED | OUTPATIENT
Start: 2025-06-24

## 2025-06-24 RX ORDER — RIBOFLAVIN (VITAMIN B2) 400 MG
400 TABLET ORAL DAILY
Qty: 30 TABLET | Refills: 6 | Status: SHIPPED | OUTPATIENT
Start: 2025-06-24

## 2025-06-24 NOTE — PROGRESS NOTES
Name: Genevieve Mcqueen      : 1995      MRN: [id number]  Encounter Provider: Delma Davison MD  Encounter Date: 2025   Encounter department: [facility]  :  Assessment & Plan  Migraine without status migrainosus, not intractable, unspecified migraine type    Orders:    Ambulatory referral to Neurology    rizatriptan (Maxalt) 10 mg tablet; Take 1 tablet (10 mg total) by mouth as needed for migraine Take at the onset of migraine; if symptoms continue or return, may take another dose at least 2 hours after first dose. Take no more than 2 doses in a day.    Magnesium 400 MG CAPS; Take 1 capsule (400 mg total) by mouth in the morning    Riboflavin 400 MG TABS; Take 1 tablet (400 mg total) by mouth in the morning    Patient is a 29 year old female with PMH of HTN, migraine, pseudotumor cerebri, who presents for evaluation of migraines.    She started getting headaches at age 23. Currently, she gets headaches 3x/month. Pain is bifrontal, pain is throbbing. Associated with photophobia, phonophobia, nausea, blurred vision. Denies vomiting, lacrimation/rhinorrhea. Denies double vision. Denies focal weakness or numbness with headaches.     She takes Topamax 25mg daily by PCP. She also takes Excedrin migraine, which helps.     Denies hx of kidney stones or glaucoma.     Patient likely has chronic migraine headaches.    Plan:  - Start Maxalt 10mg PRN for migraine headaches (do not take on more than 9 days per month)  - Start magnesium and riboflavin supplementation  -  Start Topiramate 25 mg nightly for 1 week. Then increase to 25 mg BID for 1 week. Then take 25 mg in a.m. and 50 mg in p.m. For 1 week. Then take 50 mg in a.m. and 50 mg in p.m. and continue.   - Topamax Side effects:  I highly recommend being on birth control while on this medication due to possible significant detrimental effects to fetus if you were to get pregnant. It has been associated with birth defects and  learning problems if taken during pregnancy. Thus, it is important to avoid unplanned pregnancy while taking this medication. In the future, if you plan to become pregnant, then you should discuss this with your neurologist since medication adjustments may be indicated.  - Topamax can interfere with efficacy of oral contraceptives.  - Do not combine topiramate with alcohol  - Please seek medical attention or call our office if you develop worsening vision, loss of vision, or double vision  - Follow up in 3 months      History of Present Illness   HPI     Genevieve Mcqueen is a 29 year old female with PMH of HTN, migraine, pseudotumor cerebri, who presents for evaluation of migraines.    She started getting headaches at age 23. Currently, she gets headaches 3x/month. Pain is bifrontal, pain is throbbing. Associated with photophobia, phonophobia, nausea, blurred vision. Denies vomiting, lacrimation/rhinorrhea. Denies double vision. Denies focal weakness or numbness with headaches.     She takes Topamax 25mg daily by PCP. She also takes Excedrin migraine, which helps.     Denies hx of kidney stones or glaucoma.     She was worked up for pseudotumor cerebri in 2021 in [facility], she did not have an LP, was started on topamax, which resolved symptoms.     Social Hx:  - Used to work in advertising    Medical history review:  Tobacco use: denies     Headaches:   Any family history of migraines? Denies       Any family history of aneurysms? denies      Have you seen someone else for headaches or pain? no      Headaches started at what age? 23      What is your current pain level? 6-7/10      How often do the headaches occur? 3x/month      Are you ever headache free? yes      Aura/Warning and how long does it last? Zigzag lines      How long do the headaches last? 1-2 days      Where is your headache located? Bifrontal       Associated focal signs - focal weakness/numbness, facial weakness, dizziness? Denies       Positional  component to headaches  - worse while standing or sitting? denies      Quality of pain:  [x] Throbbing  [] Pulsing   [] Achy  [] Tight band  [] Dull   [] Electrical  [] Stabbing  [] Pressure  [] Burning  [] Searing   [] Shooting  [] Other:    Associated symptoms:   [] Decreased appetite  [x] Nausea   []Vomiting   []Diarrhea  [x] Photophobia   [x]Phonophobia       []Osmophobia  [] Lacrimation  [] Nasal congestion/rhinorrhea    [] Dizziness  [] light headed  [x] Blurred vision   []Tinnitus       Number of days missed per month because of headaches:  -Miss school or work? yes    Headache triggers:    Any identified triggers for your headache? Denies     How many caffeine products to drink a day? 16-24 oz    How much water to drink a day? 64 oz    Are you current pregnant or planning on getting pregnant? No    What medications do you take or have you taken for your headaches/pain/mood?   Preventive therapy:   Current: Topamax       Previously failed:    Abortive Therapy:   Current:    Previously failed:         Do you have neck pain? Denies     Sleep Habit:  How many hours do you sleep? 5-6 hours      Review of Systems   Constitutional:  Negative for appetite change, fatigue and fever.   HENT: Negative.  Negative for hearing loss, tinnitus, trouble swallowing and voice change.    Eyes:  Positive for photophobia. Negative for pain and visual disturbance.   Respiratory: Negative.  Negative for shortness of breath.    Cardiovascular: Negative.  Negative for palpitations.   Gastrointestinal:  Positive for nausea. Negative for vomiting.   Endocrine: Negative.  Negative for cold intolerance.   Genitourinary: Negative.  Negative for dysuria, frequency and urgency.   Musculoskeletal:  Negative for back pain, gait problem, myalgias, neck pain and neck stiffness.   Skin: Negative.  Negative for rash.   Allergic/Immunologic: Negative.    Neurological:  Positive for dizziness and headaches. Negative for tremors, seizures, syncope,  "facial asymmetry, speech difficulty, weakness, light-headedness and numbness.   Hematological: Negative.  Does not bruise/bleed easily.   Psychiatric/Behavioral: Negative.  Negative for confusion, hallucinations and sleep disturbance.     I have personally reviewed the MA's review of systems and made changes as necessary.       Objective   /100 (BP Location: Left arm, Patient Position: Sitting, Cuff Size: Large)   Pulse 95   Ht 5' 10\" (1.778 m)   Wt (!) 163 kg (359 lb)   BMI 51.51 kg/m²     Physical Exam  Neurological Exam  /100 (BP Location: Left arm, Patient Position: Sitting, Cuff Size: Large)   Pulse 95   Ht 5' 10\" (1.778 m)   Wt (!) 163 kg (359 lb)   BMI 51.51 kg/m²      General Exam  General: well developed, no acute distress.  HEENT: mucous membranes moist, anicteric sclera.   Neck: supple, good ROM.    Neurological Exam  Mental Status: awake, alert, and fully oriented to person, place, time, and situation. Attention and memory intact. Fund of knowledge is appropriate for age and education.  There is no neglect.    Language: fluency, and comprehension normal.       Cranial Nerves: Pupils equal and reactive to light.  Visual fields full to confrontation. Extraocular motions intact with full versions, normal pursuits and saccades. Facial strength full and symmetric. Facial sensation intact in V1-V3. Hearing intact to voice. Tongue protrudes to midline. Palate elevates symmetrically. Speech clear without notable dysarthria. Shoulder shrug activation full and symmetric.    Motor: Normal bulk and tone. No pronator drift. Strength is 5/5 proximally and distally in all 4 extremities. No involuntary movements.    Sensory: Sensation intact to light touch distally in all extremities.    Coordination: Normal finger-to-nose. Normal rapid alternating movements.     Station and gait: Casual and tandem gait normal. Normal Romberg.    Reflexes: Reflexes 1+ throughout and symmetric.     Administrative " Statements   I have spent a total time of 50 minutes in caring for this patient on the day of the visit/encounter including Diagnostic results, Prognosis, Instructions for management, Importance of tx compliance, Counseling / Coordination of care, and Obtaining or reviewing history  .

## 2025-06-24 NOTE — ASSESSMENT & PLAN NOTE
Orders:    Ambulatory referral to Neurology    rizatriptan (Maxalt) 10 mg tablet; Take 1 tablet (10 mg total) by mouth as needed for migraine Take at the onset of migraine; if symptoms continue or return, may take another dose at least 2 hours after first dose. Take no more than 2 doses in a day.    Magnesium 400 MG CAPS; Take 1 capsule (400 mg total) by mouth in the morning    Riboflavin 400 MG TABS; Take 1 tablet (400 mg total) by mouth in the morning    Patient is a 29 year old female with PMH of HTN, migraine, pseudotumor cerebri, who presents for evaluation of migraines.    She started getting headaches at age 23. Currently, she gets headaches 3x/month. Pain is bifrontal, pain is throbbing. Associated with photophobia, phonophobia, nausea, blurred vision. Denies vomiting, lacrimation/rhinorrhea. Denies double vision. Denies focal weakness or numbness with headaches.     She takes Topamax 25mg daily by PCP. She also takes Excedrin migraine, which helps.     Denies hx of kidney stones or glaucoma.     Patient likely has chronic migraine headaches.    Plan:  - Start Maxalt 10mg PRN for migraine headaches (do not take on more than 9 days per month)  - Start magnesium and riboflavin supplementation  -  Start Topiramate 25 mg nightly for 1 week. Then increase to 25 mg BID for 1 week. Then take 25 mg in a.m. and 50 mg in p.m. For 1 week. Then take 50 mg in a.m. and 50 mg in p.m. and continue.   - Topamax Side effects:  I highly recommend being on birth control while on this medication due to possible significant detrimental effects to fetus if you were to get pregnant. It has been associated with birth defects and learning problems if taken during pregnancy. Thus, it is important to avoid unplanned pregnancy while taking this medication. In the future, if you plan to become pregnant, then you should discuss this with your neurologist since medication adjustments may be indicated.  - Topamax can interfere with  efficacy of oral contraceptives.  - Do not combine topiramate with alcohol  - Please seek medical attention or call our office if you develop worsening vision, loss of vision, or double vision  - Follow up in 3 months

## 2025-06-25 ENCOUNTER — APPOINTMENT (OUTPATIENT)
Dept: LAB | Facility: HOSPITAL | Age: 30
End: 2025-06-25
Attending: INTERNAL MEDICINE
Payer: COMMERCIAL

## 2025-06-25 DIAGNOSIS — R19.7 DIARRHEA, UNSPECIFIED TYPE: ICD-10-CM

## 2025-06-25 PROCEDURE — 36415 COLL VENOUS BLD VENIPUNCTURE: CPT

## 2025-06-26 ENCOUNTER — OFFICE VISIT (OUTPATIENT)
Dept: GASTROENTEROLOGY | Facility: CLINIC | Age: 30
End: 2025-06-26
Payer: COMMERCIAL

## 2025-06-26 VITALS
SYSTOLIC BLOOD PRESSURE: 138 MMHG | BODY MASS INDEX: 41.95 KG/M2 | TEMPERATURE: 98.8 F | HEIGHT: 70 IN | WEIGHT: 293 LBS | DIASTOLIC BLOOD PRESSURE: 90 MMHG

## 2025-06-26 DIAGNOSIS — K76.0 HEPATIC STEATOSIS: ICD-10-CM

## 2025-06-26 DIAGNOSIS — K21.00 GASTROESOPHAGEAL REFLUX DISEASE WITH ESOPHAGITIS WITHOUT HEMORRHAGE: Primary | ICD-10-CM

## 2025-06-26 DIAGNOSIS — R10.9 RIGHT SIDED ABDOMINAL PAIN: ICD-10-CM

## 2025-06-26 PROCEDURE — 99214 OFFICE O/P EST MOD 30 MIN: CPT | Performed by: PHYSICIAN ASSISTANT

## 2025-06-26 NOTE — PROGRESS NOTES
Name: Genevieve Mcqueen      : 1995      MRN: [id number]  Encounter Provider: Jaye Bowles PA-C  Encounter Date: 2025   Encounter department: [facility] [place]  :  Assessment & Plan  Gastroesophageal reflux disease with esophagitis without hemorrhage  We reviewed recent EGD results and biopsy findings at length.  Her EGD results and biopsy findings are most consistent with reflux esophagitis which has improved with high-dose PPI twice daily.  I provided education on GERD/gastritis and acid reflux disease.  We discussed that weight loss would improve her symptoms.  I encouraged the patient to eat small, frequent meals throughout the day.  We also discussed avoidance of fatty, greasy, spicy foods, excess caffeine, chocolate, alcohol, citrus foods, tomato sauces.  She expressed understanding of this.  At this time, I recommend patient continue with omeprazole 40 mg twice daily before meals for now and continue to monitor symptoms.  No plans for further workup at this time.  All questions answered.  Will plan to taper patient to once daily PPI in follow-up.  Patient appreciative of care.       Right sided abdominal pain  We reviewed her recent workup at length including ultrasound, EGD, colonoscopy.  There is nothing seen to explain her ongoing right sided abdominal pain and nausea.  Will order a nuclear medicine gallbladder HIDA scan for completeness to rule out possible underlying biliary dyskinesia.  If this is negative, patient may benefit from trial of dicyclomine +/- repeat CT imaging.  Orders:    NM hepatobiliary w rx; Future    Hepatic steatosis  Seen on US.  I congratulated patient on recent weight loss.  I recommended ongoing weight loss as the treatment for hepatic steatosis.  We discussed that weight loss will also improve her GERD symptoms.         Patient was instructed to call the office with any questions, concerns, new/ worsening/ persisting GI  symptoms. Advised patient go to the ER with any severe or worsening abdominal pain, fevers/ chills, intractable N/V, chest pain, SOB, dizziness, lightheadedness, feeling something stuck in esophagus that will not go down. Patient expressed understanding and is in agreement with treatment plan.     Will plan to follow up in about 4 to 6 months    History of Present Illness   HPI  Genevieve Mcqueen is a 29 y.o. female who presents to the office today for follow-up.  Patient was last seen in the office by me 3/17/2025, previous office note was reviewed.  At last office visit I recommended patient take famotidine 40 mg once daily at bedtime and I ordered an EGD as well as a right upper quadrant ultrasound.  Right upper quadrant ultrasound was completed 4/10/2025 which showed mildly enlarged liver with hepatic steatosis otherwise no gallstones.  EGD was completed 3/20/2025, this was reviewed, this was notable for a hiatal hernia and LA grade B esophagitis in the middle third and the lower third of the esophagus.  Otherwise the stomach and duodenum were normal.  Biopsy of the stomach was negative for H. pylori.  Duodenal biopsy was negative for celiac disease.  Distal esophageal biopsy was notable for eosinophils up to 73 per high-power field.  Proximal esophageal biopsy showed eosinophils up to 11 per high-power field.  Pathology results favor GERD.  Following pathology results it was recommended patient take omeprazole 40 mg twice a day and undergo repeat EGD  Patient underwent repeat EGD 6/18/2025, results reviewed, there were abnormal mucosa with concentric rings and linear furrows in the middle third of the esophagus and lower third of the esophagus.  Otherwise the stomach and duodenum were normal-appearing.  Mid esophageal biopsies at that time were completely normal and negative for EOE.  Lower esophageal biopsies showed mild increased eosinophils however consistent with reflux esophagitis.  Patient went on to  undergo colonoscopy 6/18/2025 for right sided abdominal pain, elevated fecal calprotectin, diarrhea, this is notable for some nodular mucosa in the terminal ileum and internal hemorrhoids, otherwise the colon was completely normal-appearing.  Terminal ileum biopsy was completely normal.  Otherwise random colon biopsies were normal and negative for colitis or microscopic colitis.      Patient states she is doing well overall compared to last office visit.  She has been under a lot of stress.  She was laid off from her job.  She has been taking omeprazole 40 mg twice daily before meals.  She feels as though her swallowing has somewhat improved.  She does continue with right sided abdominal pain that comes and goes with associated nausea.  The pain can radiate into her back at times.  Nothing makes this pain worse.  Patient tells me that she is trying to lose weight. Patient has lost about 20 pounds since last office visit.  She had tried Zepbound for weight loss but unfortunately this caused significant diarrhea so she stopped.  Her stool is much more regular and formed now.  No black or bloody stools.    She plans to travel to [place] next week and [place] as well with her family.    Patient did undergo a CTA abdomen and pelvis without contrast for right sided abdominal pain in April 2023, this report was reviewed, this showed no acute abdominal pelvic pathology.       Review of Systems   Constitutional:  Negative for chills and fever.   HENT:  Negative for ear pain and sore throat.    Eyes:  Negative for pain and visual disturbance.   Respiratory:  Negative for cough and shortness of breath.    Cardiovascular:  Negative for chest pain and palpitations.   Gastrointestinal:  Positive for abdominal pain, diarrhea and nausea. Negative for vomiting.   Genitourinary:  Negative for dysuria and hematuria.   Musculoskeletal:  Negative for arthralgias and back pain.   Skin:  Negative for color change and rash.   Neurological:   "Negative for seizures and syncope.   All other systems reviewed and are negative.      Objective   /90 (BP Location: Left arm, Patient Position: Sitting, Cuff Size: Standard) Comment (BP Location): lower part  Temp 98.8 °F (37.1 °C) (Tympanic)   Ht 5' 10\" (1.778 m)   Wt (!) 164 kg (361 lb 6.4 oz)   BMI 51.86 kg/m²      Physical Exam  Vitals reviewed.   Constitutional:       General: She is not in acute distress.     Appearance: She is obese. She is not toxic-appearing.   HENT:      Head: Normocephalic and atraumatic.     Eyes:      Extraocular Movements: Extraocular movements intact.      Conjunctiva/sclera: Conjunctivae normal.       Cardiovascular:      Rate and Rhythm: Normal rate and regular rhythm.   Pulmonary:      Effort: Pulmonary effort is normal. No respiratory distress.      Breath sounds: Normal breath sounds.   Abdominal:      General: Bowel sounds are normal.      Palpations: Abdomen is soft.      Tenderness: There is no abdominal tenderness.     Musculoskeletal:         General: No swelling or tenderness.      Cervical back: Normal range of motion and neck supple.     Skin:     General: Skin is warm and dry.      Coloration: Skin is not jaundiced.     Neurological:      General: No focal deficit present.      Mental Status: She is alert and oriented to person, place, and time. Mental status is at baseline.     Psychiatric:         Mood and Affect: Mood normal.         Behavior: Behavior normal.         Thought Content: Thought content normal.           "

## 2025-06-26 NOTE — ASSESSMENT & PLAN NOTE
We reviewed recent EGD results and biopsy findings at length.  Her EGD results and biopsy findings are most consistent with reflux esophagitis which has improved with high-dose PPI twice daily.  I provided education on GERD/gastritis and acid reflux disease.  We discussed that weight loss would improve her symptoms.  I encouraged the patient to eat small, frequent meals throughout the day.  We also discussed avoidance of fatty, greasy, spicy foods, excess caffeine, chocolate, alcohol, citrus foods, tomato sauces.  She expressed understanding of this.  At this time, I recommend patient continue with omeprazole 40 mg twice daily before meals for now and continue to monitor symptoms.  No plans for further workup at this time.  All questions answered.  Will plan to taper patient to once daily PPI in follow-up.  Patient appreciative of care.

## 2025-07-08 ENCOUNTER — HOSPITAL ENCOUNTER (OUTPATIENT)
Dept: RADIOLOGY | Facility: HOSPITAL | Age: 30
Discharge: HOME | End: 2025-07-08
Attending: PHYSICIAN ASSISTANT
Payer: COMMERCIAL

## 2025-07-08 VITALS
DIASTOLIC BLOOD PRESSURE: 89 MMHG | OXYGEN SATURATION: 97 % | HEART RATE: 79 BPM | RESPIRATION RATE: 18 BRPM | SYSTOLIC BLOOD PRESSURE: 151 MMHG

## 2025-07-08 DIAGNOSIS — R10.9 RIGHT SIDED ABDOMINAL PAIN: ICD-10-CM

## 2025-07-08 PROCEDURE — A9537 TC99M MEBROFENIN: HCPCS

## 2025-07-08 PROCEDURE — 78227 HEPATOBIL SYST IMAGE W/DRUG: CPT

## 2025-07-08 RX ORDER — MORPHINE SULFATE 4 MG/ML
INJECTION, SOLUTION INTRAMUSCULAR; INTRAVENOUS
Status: COMPLETED
Start: 2025-07-08 | End: 2025-07-08

## 2025-07-08 RX ORDER — MORPHINE SULFATE 4 MG/ML
4 INJECTION, SOLUTION INTRAMUSCULAR; INTRAVENOUS EVERY 4 HOURS PRN
Status: COMPLETED | OUTPATIENT
Start: 2025-07-08 | End: 2025-07-08

## 2025-07-08 RX ADMIN — MORPHINE SULFATE 4 MG: 4 INJECTION, SOLUTION INTRAMUSCULAR; INTRAVENOUS at 09:45

## 2025-07-15 LAB
A-LACTALB IGE QN: <0.1 KAU/I (ref 0–0.1)
ALMOND IGE QN: 0.13 KUA/I (ref 0–0.1)
ARA H6 PEANUT: <0.1 KUA/I (ref 0–0.1)
B-LACTOGLOB IGE QN: 1.03 KAU/I (ref 0–0.1)
CASEIN IGE QN: <0.1 KAU/I (ref 0–0.1)
CASHEW NUT IGE QN: <0.1 KUA/I (ref 0–0.1)
CODFISH IGE QN: <0.1 KUA/I (ref 0–0.1)
EGG WHITE IGE QN: 0.64 KUA/I (ref 0–0.1)
GLUTEN IGE QN: <0.1 KUA/I (ref 0–0.1)
HAZELNUT IGE QN: 0.34 KUA/L (ref 0–0.1)
MILK IGE QN: 1.11 KUA/I (ref 0–0.1)
OVALB IGE QN: <0.1 KAU/I (ref 0–0.1)
OVOMUCOID IGE QN: 0.52 KAU/I (ref 0–0.1)
PEANUT (RARA H) 1 IGE QN: <0.1 KUA/I (ref 0–0.1)
PEANUT (RARA H) 2 IGE QN: <0.1 KUA/I (ref 0–0.1)
PEANUT (RARA H) 3 IGE QN: <0.1 KUA/I (ref 0–0.1)
PEANUT (RARA H) 8 IGE QN: <0.1 KUA/I (ref 0–0.1)
PEANUT (RARA H) 9 IGE QN: 0.6 KUA/I (ref 0–0.1)
PEANUT IGE QN: 0.67 KUA/I (ref 0–0.1)
SALMON IGE QN: <0.1 KUA/I (ref 0–0.1)
SCALLOP IGE QN: <0.1 KUA/L (ref 0–0.1)
SESAME SEED IGE QN: 0.44 KUA/I (ref 0–0.1)
SHRIMP IGE QN: <0.1 KUA/L (ref 0–0.1)
SOYBEAN IGE QN: 0.16 KUA/I (ref 0–0.1)
TOTAL IGE SMQN RAST: 449 KU/L (ref 0–113)
TUNA IGE QN: <0.1 KUA/I (ref 0–0.1)
WALNUT IGE QN: 0.64 KUA/I (ref 0–0.1)
WHEAT IGE QN: 0.27 KUA/I (ref 0–0.1)
